# Patient Record
Sex: FEMALE | Race: WHITE | NOT HISPANIC OR LATINO | Employment: FULL TIME | URBAN - NONMETROPOLITAN AREA
[De-identification: names, ages, dates, MRNs, and addresses within clinical notes are randomized per-mention and may not be internally consistent; named-entity substitution may affect disease eponyms.]

---

## 2017-01-03 ENCOUNTER — OFFICE VISIT (OUTPATIENT)
Dept: SURGERY | Facility: CLINIC | Age: 47
End: 2017-01-03

## 2017-01-03 VITALS — BODY MASS INDEX: 46.55 KG/M2 | DIASTOLIC BLOOD PRESSURE: 77 MMHG | WEIGHT: 262.8 LBS | SYSTOLIC BLOOD PRESSURE: 125 MMHG

## 2017-01-03 DIAGNOSIS — R10.13 EPIGASTRIC PAIN: Primary | ICD-10-CM

## 2017-01-03 DIAGNOSIS — K21.9 GASTROESOPHAGEAL REFLUX DISEASE, ESOPHAGITIS PRESENCE NOT SPECIFIED: ICD-10-CM

## 2017-01-03 PROCEDURE — 99212 OFFICE O/P EST SF 10 MIN: CPT | Performed by: SURGERY

## 2017-01-03 RX ORDER — AMOXAPINE 50 MG/1
50 TABLET ORAL 2 TIMES DAILY
Status: ON HOLD | COMMUNITY
End: 2018-03-02

## 2017-01-03 NOTE — PROGRESS NOTES
1/3/2017    Patient Information  Nancy Oliver  [address] [address]  1970  [phone] (home)     Chief Complaint   Patient presents with   • Post-op     EGD and BRAVO         HPI  Patient is a 46-year-old white female for follow-up on EGD with pH probe for severe epigastric pain as well as severe heartburn.  EGD suggestive gastroparesis because there was food still in her stomach.  Helicobacter is positive.  She is now been on medication for this approximately 5 days.  She reports she is much better already.  She reports she's had Helicobacter in the past.  PH study was in adequate.  Only 7 hours of data was collected.  Patient reports she didn't think the box working.        Review of Systems:    General: negative  Integumentary: negative  Eyes: negative  ENT: negative  Respiratory: negative  Gastrointestinal: heartburn, diarrhea and abdominal pain  Cardiovascular: negative  Neurological: headaches and dizziness  Psychiatric: negative  Hematologic/Lymphatic: easy bruising  Genitourinary: negative  Musculoskeletal: painful joints  Endocrine: underactive thyroid  Breasts: negative  There is no problem list on file for this patient.        Past Medical History   Diagnosis Date   • Bronchitis      seasonal   • Carpal tunnel syndrome    • Disease of thyroid gland    • Fibromyalgia    • History of gallstones    • History of transfusion    • Hypertension    • Kidney stones    • Lupus    • Migraine    • Reflux esophagitis          Past Surgical History   Procedure Laterality Date   •  section       x3   • Cholecystectomy     • [place] tooth extraction     • Carpal tunnel release     • Kidney stone surgery       lithotripsy   • Bravo procedure N/A 2016     Procedure: ESOPHAGOGASTRODUODENOSCOPY AND LINDSEY;  Surgeon: Jorge Carmona MD;  Location: [facility];  Service:          Family History   Problem Relation Age of Onset   • Hypertension Mother    • Heart disease Mother    •  Hypertension Father    • Heart disease Father    • Cancer Daughter    • Diabetes Maternal Aunt    • Diabetes Maternal Grandmother          Social History   Substance Use Topics   • Smoking status: Never Smoker   • Smokeless tobacco: None   • Alcohol use No       Current Outpatient Prescriptions   Medication Sig Dispense Refill   • amoxapine (ASENDIN) 50 MG tablet Take 50 mg by mouth 2 (Two) Times a Day.     • B Complex Vitamins (VITAMIN B COMPLEX PO) Take  by mouth.     • DULoxetine (CYMBALTA) 60 MG capsule Take 60 mg by mouth Daily.     • fluticasone (FLONASE) 50 MCG/ACT nasal spray 2 sprays into each nostril Daily.     • FLUTICASONE PROPIONATE, INHAL, IN Inhale 50 mg.     • hydroxychloroquine (PLAQUENIL) 200 MG tablet Take 200 mg by mouth Daily.     • levothyroxine (SYNTHROID, LEVOTHROID) 50 MCG tablet Take 50 mcg by mouth Daily.     • lisinopril (PRINIVIL,ZESTRIL) 20 MG tablet Take 20 mg by mouth Daily.     • ondansetron (ZOFRAN) 4 MG tablet Take 4 mg by mouth Every 8 (Eight) Hours As Needed for nausea or vomiting.     • Vitamin D, Cholecalciferol, (CHOLECALCIFEROL) 400 UNITS tablet Take 400 Units by mouth Daily.     • aspirin-acetaminophen-caffeine (EXCEDRIN MIGRAINE) 250-250-65 MG per tablet Take 1 tablet by mouth Every 6 (Six) Hours As Needed for headaches.     • omeprazole (priLOSEC) 40 MG capsule Take 40 mg by mouth Daily.       No current facility-administered medications for this visit.          Allergies  Contrave [naltrexone-bupropion hcl er]; Keflex [cephalexin]; Penicillins; Sulfa antibiotics; Topamax [topiramate]; and Venlafaxine hcl      Physical Exam      Visit Vitals   • /77   • Wt 262 lb 12.8 oz (119 kg)   • LMP 12/14/2016   • BMI 46.55 kg/m2         ASSESSMENT  Helicobacter gastritis  Possible gastroparesis        PLAN    Continue medication for Helicobacter.  We'll check her back in 1 month.  Depending on how she is doing may need to get a gastric emptying study.  Her pH study was in  adequate, may need to repeat this as well, depending on her symptoms.  Told her to keep taking her PPIs after her antibiotics are finished.        Jorge Carmona MD

## 2017-01-03 NOTE — MR AVS SNAPSHOT
Nancy Oliver   1/3/2017 9:45 AM   Office Visit    Dept Phone:  [phone]   Encounter #:  [id number]    Provider:  Jorge Carmona MD   Department:  [facility] GENERAL SURGERY                Your Full Care Plan              Your Updated Medication List          This list is accurate as of: 1/3/17 11:14 AM.  Always use your most recent med list.                amoxapine 50 MG tablet   Commonly known as:  ASENDIN       aspirin-acetaminophen-caffeine 250-250-65 MG per tablet   Commonly known as:  EXCEDRIN MIGRAINE       DULoxetine 60 MG capsule   Commonly known as:  CYMBALTA       fluticasone 50 MCG/ACT nasal spray   Commonly known as:  FLONASE       FLUTICASONE PROPIONATE (INHAL) IN       hydroxychloroquine 200 MG tablet   Commonly known as:  PLAQUENIL       levothyroxine 50 MCG tablet   Commonly known as:  SYNTHROID, LEVOTHROID       lisinopril 20 MG tablet   Commonly known as:  PRINIVIL,ZESTRIL       omeprazole 40 MG capsule   Commonly known as:  priLOSEC       ondansetron 4 MG tablet   Commonly known as:  ZOFRAN       VITAMIN B COMPLEX PO       Vitamin D (Cholecalciferol) 400 UNITS tablet   Commonly known as:  CHOLECALCIFEROL               Instructions     None    Patient Instructions History      Upcoming Appointments     Visit Type Date Time Department    POST-OP 1/3/2017  9:45 AM [facility] [place]    FOLLOW UP 2017  9:45 AM [facility] Signup     [facility] [employer] allows you to send messages to your doctor, view your test results, renew your prescriptions, schedule appointments, and more. To sign up, go to [employer] and click on the Sign Up Now link in the New User? box. Enter your [employer] Activation Code exactly as it appears below along with the last four digits of your Social Security Number and your Date of Birth () to complete the sign-up process. If you do not sign up before the expiration date, you must  request a new code.    [employer] Activation Code: [email or web address]  Expires: 1/17/2017 11:14 AM    If you have questions, you can email [email or web address] or call [phone] to talk to our [employer] staff. Remember, [employer] is NOT to be used for urgent needs. For medical emergencies, dial 911.               Other Info from Your Visit           Your Appointments     Feb 07, 2017  9:45 AM EST   Follow Up with Jorge Carmona MD   [facility] GENERAL SURGERY (--)    [address] [place] [address] [phone]           Arrive 15 minutes prior to appointment.              Allergies     Contrave [Naltrexone-bupropion Hcl Er]      Keflex [Cephalexin]      Penicillins      Sulfa Antibiotics      Topamax [Topiramate]      Venlafaxine Hcl        Reason for Visit     Post-op EGD and BRAVO      Vital Signs     Blood Pressure Weight Last Menstrual Period Body Mass Index Smoking Status       125/77 262 lb 12.8 oz (119 kg) 12/14/2016 46.55 kg/m2 Never Smoker

## 2017-01-03 NOTE — LETTER
January 3, 2017     HAZEL Hurtado  [address]  [place] [address]    Patient: Nancy Oliver   YOB: 1970   Date of Visit: 1/3/2017       Dear HAZEL Arteaga:    Thank you for referring Nancy Oliver to me for evaluation. Below are the relevant portions of my assessment and plan of care.       HPI  Patient is a 46-year-old white female for follow-up on EGD with pH probe for severe epigastric pain as well as severe heartburn.  EGD suggestive gastroparesis because there was food still in her stomach.  Helicobacter is positive.  She is now been on medication for this approximately 5 days.  She reports she is much better already.  She reports she's had Helicobacter in the past.  PH study was in adequate.  Only 7 hours of data was collected.  Patient reports she didn't think the box working.                ASSESSMENT  Helicobacter gastritis  Possible gastroparesis        PLAN    Continue medication for Helicobacter.  We'll check her back in 1 month.  Depending on how she is doing may need to get a gastric emptying study.  Her pH study was in adequate, may need to repeat this as well, depending on her symptoms.  Told her to keep taking her PPIs after her antibiotics are finished.        If you have questions, please do not hesitate to call me. I look forward to following Nancy along with you.         Sincerely,        Jorge Carmona MD        CC: No Recipients

## 2017-01-10 ENCOUNTER — TELEPHONE (OUTPATIENT)
Dept: SURGERY | Facility: CLINIC | Age: 47
End: 2017-01-10

## 2017-01-13 RX ORDER — LANSOPRAZOLE 15 MG/1
15 CAPSULE, DELAYED RELEASE ORAL DAILY
Qty: 30 CAPSULE | Refills: 1 | Status: SHIPPED | OUTPATIENT
Start: 2017-01-13 | End: 2018-01-13

## 2017-01-25 RX ORDER — FLUCONAZOLE 200 MG/1
200 TABLET ORAL DAILY
Qty: 5 TABLET | Refills: 0 | Status: SHIPPED | OUTPATIENT
Start: 2017-01-25 | End: 2017-01-30

## 2017-02-06 ENCOUNTER — TELEPHONE (OUTPATIENT)
Dept: SURGERY | Facility: CLINIC | Age: 47
End: 2017-02-06

## 2017-02-07 ENCOUNTER — OFFICE VISIT (OUTPATIENT)
Dept: SURGERY | Facility: CLINIC | Age: 47
End: 2017-02-07

## 2017-02-07 VITALS
SYSTOLIC BLOOD PRESSURE: 122 MMHG | DIASTOLIC BLOOD PRESSURE: 81 MMHG | RESPIRATION RATE: 18 BRPM | WEIGHT: 262 LBS | BODY MASS INDEX: 46.42 KG/M2 | HEART RATE: 80 BPM | TEMPERATURE: 98.2 F | HEIGHT: 63 IN

## 2017-02-07 DIAGNOSIS — K21.9 GASTROESOPHAGEAL REFLUX DISEASE, ESOPHAGITIS PRESENCE NOT SPECIFIED: Primary | ICD-10-CM

## 2017-02-07 PROCEDURE — 99212 OFFICE O/P EST SF 10 MIN: CPT | Performed by: SURGERY

## 2017-02-07 NOTE — PROGRESS NOTES
2017    Patient Information  Nancy Oliver  [address] [address]  1970  [phone] (home)     Chief Complaint   Patient presents with   • Follow-up     1 Month Follow up         HPI  Patient is a 46-year-old white female here for follow-up on taking her medication for Helicobacter.  She is having bad symptoms of heartburn.  I scoped her approximately 2 months ago she still had food in her stomach.  Her pH study was in adequate.  However she was found to have Helicobacter.  I also had her continue on Prevacid.  She returns now and says she is doing much better.        Review of Systems:    General: negative  Integumentary: negative  Eyes: negative  ENT: negative  Respiratory: negative  Gastrointestinal: heartburn, diarrhea and abdominal pain  Cardiovascular: negative  Neurological: headaches and dizziness  Psychiatric: negative  Hematologic/Lymphatic: easy bruising  Genitourinary: negative  Musculoskeletal: painful joints  Endocrine: underactive thyroid  Breasts: negative    There is no problem list on file for this patient.        Past Medical History   Diagnosis Date   • Bronchitis      seasonal   • Carpal tunnel syndrome    • Disease of thyroid gland    • Fibromyalgia    • History of gallstones    • History of transfusion    • Hypertension    • Kidney stones    • Lupus    • Migraine    • Reflux esophagitis          Past Surgical History   Procedure Laterality Date   •  section       x3   • Cholecystectomy     • [place] tooth extraction     • Carpal tunnel release     • Kidney stone surgery       lithotripsy   • Bravo procedure N/A 2016     Procedure: ESOPHAGOGASTRODUODENOSCOPY AND LINDSEY;  Surgeon: Jorge Carmona MD;  Location: [facility];  Service:          Family History   Problem Relation Age of Onset   • Hypertension Mother    • Heart disease Mother    • Hypertension Father    • Heart disease Father    • Cancer Daughter    • Diabetes Maternal Aunt    • Diabetes  "Maternal Grandmother          Social History   Substance Use Topics   • Smoking status: Never Smoker   • Smokeless tobacco: None   • Alcohol use No       Current Outpatient Prescriptions   Medication Sig Dispense Refill   • amoxapine (ASENDIN) 50 MG tablet Take 50 mg by mouth 2 (Two) Times a Day.     • aspirin-acetaminophen-caffeine (EXCEDRIN MIGRAINE) 250-250-65 MG per tablet Take 1 tablet by mouth Every 6 (Six) Hours As Needed for headaches.     • B Complex Vitamins (VITAMIN B COMPLEX PO) Take  by mouth.     • DULoxetine (CYMBALTA) 60 MG capsule Take 60 mg by mouth Daily.     • fluticasone (FLONASE) 50 MCG/ACT nasal spray 2 sprays into each nostril Daily.     • FLUTICASONE PROPIONATE, INHAL, IN Inhale 50 mg.     • hydroxychloroquine (PLAQUENIL) 200 MG tablet Take 200 mg by mouth Daily.     • lansoprazole (PREVACID) 15 MG capsule Take 1 capsule by mouth Daily. 30 capsule 1   • levothyroxine (SYNTHROID, LEVOTHROID) 50 MCG tablet Take 50 mcg by mouth Daily.     • lisinopril (PRINIVIL,ZESTRIL) 20 MG tablet Take 20 mg by mouth Daily.     • omeprazole (priLOSEC) 40 MG capsule Take 40 mg by mouth Daily.     • ondansetron (ZOFRAN) 4 MG tablet Take 4 mg by mouth Every 8 (Eight) Hours As Needed for nausea or vomiting.     • Vitamin D, Cholecalciferol, (CHOLECALCIFEROL) 400 UNITS tablet Take 400 Units by mouth Daily.       No current facility-administered medications for this visit.          Allergies  Contrave [naltrexone-bupropion hcl er]; Keflex [cephalexin]; Penicillins; Sulfa antibiotics; Topamax [topiramate]; and Venlafaxine hcl      Physical Exam      Visit Vitals   • /81   • Pulse 80   • Temp 98.2 °F (36.8 °C) (Oral)   • Resp 18   • Ht 63\" (160 cm)   • Wt 262 lb (119 kg)   • BMI 46.41 kg/m2         ASSESSMENT  Gastroesophageal reflux disease adequately controlled with Prevacid        PLAN    Return when necessary        Jorge Carmona MD    "

## 2017-04-13 ENCOUNTER — HOSPITAL ENCOUNTER (OUTPATIENT)
Dept: HOSPITAL 79 - KOH-I | Age: 47
End: 2017-04-13
Payer: COMMERCIAL

## 2017-04-13 DIAGNOSIS — R05: Primary | ICD-10-CM

## 2018-02-22 ENCOUNTER — OFFICE VISIT (OUTPATIENT)
Dept: SURGERY | Facility: CLINIC | Age: 48
End: 2018-02-22

## 2018-02-22 VITALS
DIASTOLIC BLOOD PRESSURE: 93 MMHG | WEIGHT: 252 LBS | HEIGHT: 63 IN | BODY MASS INDEX: 44.65 KG/M2 | SYSTOLIC BLOOD PRESSURE: 140 MMHG | HEART RATE: 89 BPM | TEMPERATURE: 98.3 F | RESPIRATION RATE: 17 BRPM

## 2018-02-22 DIAGNOSIS — R10.13 EPIGASTRIC PAIN: ICD-10-CM

## 2018-02-22 DIAGNOSIS — K62.5 RECTAL BLEED: ICD-10-CM

## 2018-02-22 DIAGNOSIS — K21.9 GASTROESOPHAGEAL REFLUX DISEASE, ESOPHAGITIS PRESENCE NOT SPECIFIED: Primary | ICD-10-CM

## 2018-02-22 PROCEDURE — 99214 OFFICE O/P EST MOD 30 MIN: CPT | Performed by: SURGERY

## 2018-02-22 RX ORDER — LIDOCAINE 50 MG/G
OINTMENT TOPICAL
Refills: 11 | COMMUNITY
Start: 2018-02-13 | End: 2022-05-24

## 2018-02-22 RX ORDER — CLINDAMYCIN PHOSPHATE 11.9 MG/ML
SOLUTION TOPICAL
Refills: 11 | COMMUNITY
Start: 2018-02-13 | End: 2021-07-16

## 2018-02-22 RX ORDER — FOLIC ACID 1 MG/1
TABLET ORAL
Refills: 2 | COMMUNITY
Start: 2018-02-12 | End: 2021-07-16

## 2018-02-22 NOTE — PROGRESS NOTES
2018    Patient Information  Nancy Oliver  [address] [place] [address]  1970  [phone] (home)     Chief Complaint   Patient presents with   • Colonoscopy     Referred for Colonoscopy       HPI  Patient is a 47-year-old white female who is having blood in stool and constipation.  She is also having stool which has a ground appearance.  She has an uncle who had colon cancer.  This is been going on for a few weeks and is getting worse.  Patient also has lots reflux symptomatology.  I performed a pH study on her a few months ago however it was an inadequate study.    Review of Systems:  The Past medical history, family history, social history, medication list, allergies, and Review of Systems has been reviewed and confirmed.    General: negative  Integumentary: negative  Eyes: negative  ENT: negative  Respiratory: negative  Gastrointestinal: heartburn, diarrhea and abdominal pain, blood in stool,constipation  Cardiovascular: negative  Neurological: headaches and dizziness  Psychiatric: negative  Hematologic/Lymphatic: easy bruising  Genitourinary: negative  Musculoskeletal: painful joints  Endocrine: underactive thyroid  Breasts: negative      There is no problem list on file for this patient.        Past Medical History:   Diagnosis Date   • Bronchitis     seasonal   • Carpal tunnel syndrome    • Disease of thyroid gland    • Fibromyalgia    • History of gallstones    • History of transfusion    • Hypertension    • Kidney stones    • Lupus    • Migraine    • Reflux esophagitis          Past Surgical History:   Procedure Laterality Date   • BRAVO PROCEDURE N/A 2016    Procedure: ESOPHAGOGASTRODUODENOSCOPY AND LINDSEY;  Surgeon: Jorge Carmona MD;  Location: [facility];  Service:    • CARPAL TUNNEL RELEASE     •  SECTION      x3   • CHOLECYSTECTOMY     • KIDNEY STONE SURGERY      lithotripsy   • WISDOM TOOTH EXTRACTION           Family History   Problem Relation Age of Onset   •  Hypertension Mother    • Heart disease Mother    • Hypertension Father    • Heart disease Father    • Cancer Daughter    • Diabetes Maternal Aunt    • Diabetes Maternal Grandmother          Social History   Substance Use Topics   • Smoking status: Never Smoker   • Smokeless tobacco: Not on file   • Alcohol use No       Current Outpatient Prescriptions   Medication Sig Dispense Refill   • amoxapine (ASENDIN) 50 MG tablet Take 50 mg by mouth 2 (Two) Times a Day.     • aspirin-acetaminophen-caffeine (EXCEDRIN MIGRAINE) 250-250-65 MG per tablet Take 1 tablet by mouth Every 6 (Six) Hours As Needed for headaches.     • B Complex Vitamins (VITAMIN B COMPLEX PO) Take  by mouth.     • clindamycin (CLEOCIN T) 1 % external solution Twice daily, mix the contents of 1 bottle (30ml) in foot bath and soak feet for 10 minutes Follow attached instructions  11   • DULoxetine (CYMBALTA) 60 MG capsule Take 60 mg by mouth Daily.     • econazole nitrate (SPECTAZOLE) 1 % cream Twice daily, mix 30 grams (contents of 1 tube) in foot bath and soak feet for 10 minutes.  Follow attached instructions  11   • fluticasone (FLONASE) 50 MCG/ACT nasal spray 2 sprays into each nostril Daily.     • FLUTICASONE PROPIONATE, INHAL, IN Inhale 50 mg.     • folic acid (FOLVITE) 1 MG tablet   2   • hydroxychloroquine (PLAQUENIL) 200 MG tablet Take 200 mg by mouth Daily.     • levothyroxine (SYNTHROID, LEVOTHROID) 50 MCG tablet Take 50 mcg by mouth Daily.     • lidocaine (XYLOCAINE) 5 % ointment Apply a pea size amount (1gram) to affected area up to twice daily as needed for pain  11   • lisinopril (PRINIVIL,ZESTRIL) 20 MG tablet Take 20 mg by mouth Daily.     • methotrexate 2.5 MG tablet   2   • omeprazole (priLOSEC) 40 MG capsule Take 40 mg by mouth Daily.     • ondansetron (ZOFRAN) 4 MG tablet Take 4 mg by mouth Every 8 (Eight) Hours As Needed for nausea or vomiting.     • Probiotic Product (PROBIOTIC + OMEGA-3 PO) Take  by mouth.     • Vitamin D,  "Cholecalciferol, (CHOLECALCIFEROL) 400 UNITS tablet Take 400 Units by mouth Daily.       No current facility-administered medications for this visit.          Allergies  Contrave [naltrexone-bupropion hcl er]; Keflex [cephalexin]; Penicillins; Sulfa antibiotics; Topamax [topiramate]; and Venlafaxine hcl    /93  Pulse 89  Temp 98.3 °F (36.8 °C)  Resp 17  Ht 160 cm (63\")  Wt 114 kg (252 lb)  BMI 44.64 kg/m2     Physical Exam   Constitutional: She is oriented to person, place, and time. She appears well-developed and well-nourished. No distress.   HENT:   Head: Normocephalic.   Right Ear: External ear normal.   Left Ear: External ear normal.   Nose: Nose normal.   Mouth/Throat: Oropharynx is clear and moist.   Eyes: Conjunctivae and EOM are normal. Right eye exhibits no discharge. Left eye exhibits no discharge.   Neck: Normal range of motion. No JVD present. No tracheal deviation present. No thyromegaly present.   Cardiovascular: Normal rate, regular rhythm, normal heart sounds and intact distal pulses.  Exam reveals no gallop and no friction rub.    No murmur heard.  Pulmonary/Chest: Effort normal and breath sounds normal. No stridor. No respiratory distress. She has no wheezes. She has no rales. She exhibits no tenderness.   Abdominal: Soft. Bowel sounds are normal. She exhibits no distension and no mass. There is no tenderness. There is no rebound and no guarding. No hernia.   Genitourinary: Rectal exam shows guaiac negative stool.   Musculoskeletal: Normal range of motion. She exhibits no edema, tenderness or deformity.   Lymphadenopathy:     She has no cervical adenopathy.   Neurological: She is alert and oriented to person, place, and time. She has normal reflexes. She displays normal reflexes. No cranial nerve deficit. She exhibits normal muscle tone. Coordination normal.   Skin: Skin is warm and dry. No rash noted. She is not diaphoretic. No erythema. No pallor.   Psychiatric: She has a normal mood " and affect. Her behavior is normal. Judgment and thought content normal.   Nursing note and vitals reviewed.                ASSESSMENT  Reflux disease and blood per rectum        PLAN    We'll repeat pH study and perform colonoscopy          This document signed by Jorge Carmona MD February 22, 2018 10:15 AM

## 2018-02-23 PROBLEM — R10.13 EPIGASTRIC PAIN: Status: ACTIVE | Noted: 2018-02-23

## 2018-02-23 PROBLEM — K62.5 RECTAL BLEED: Status: ACTIVE | Noted: 2018-02-23

## 2018-02-23 PROBLEM — K21.9 GASTROESOPHAGEAL REFLUX DISEASE: Status: ACTIVE | Noted: 2018-02-23

## 2018-02-26 RX ORDER — KETOROLAC TROMETHAMINE 10 MG/1
10 TABLET, FILM COATED ORAL EVERY 6 HOURS PRN
COMMUNITY
End: 2022-05-24

## 2018-02-26 RX ORDER — TRAMADOL HYDROCHLORIDE 50 MG/1
50 TABLET ORAL EVERY 6 HOURS PRN
COMMUNITY
End: 2022-11-10 | Stop reason: HOSPADM

## 2018-03-02 ENCOUNTER — HOSPITAL ENCOUNTER (OUTPATIENT)
Facility: HOSPITAL | Age: 48
Setting detail: HOSPITAL OUTPATIENT SURGERY
Discharge: HOME | End: 2018-03-02
Attending: SURGERY | Admitting: SURGERY

## 2018-03-02 ENCOUNTER — ANESTHESIA EVENT (OUTPATIENT)
Dept: PERIOP | Facility: HOSPITAL | Age: 48
End: 2018-03-02

## 2018-03-02 ENCOUNTER — ANESTHESIA (OUTPATIENT)
Dept: PERIOP | Facility: HOSPITAL | Age: 48
End: 2018-03-02

## 2018-03-02 VITALS
BODY MASS INDEX: 44.65 KG/M2 | DIASTOLIC BLOOD PRESSURE: 68 MMHG | SYSTOLIC BLOOD PRESSURE: 111 MMHG | HEIGHT: 63 IN | WEIGHT: 252 LBS | OXYGEN SATURATION: 97 % | RESPIRATION RATE: 20 BRPM | TEMPERATURE: 98 F | HEART RATE: 68 BPM

## 2018-03-02 LAB
B-HCG UR QL: NEGATIVE
INTERNAL NEGATIVE CONTROL: NEGATIVE
INTERNAL POSITIVE CONTROL: POSITIVE
Lab: NORMAL

## 2018-03-02 PROCEDURE — 25010000002 PROPOFOL 10 MG/ML EMULSION: Performed by: NURSE ANESTHETIST, CERTIFIED REGISTERED

## 2018-03-02 PROCEDURE — 25010000002 ONDANSETRON PER 1 MG: Performed by: NURSE ANESTHETIST, CERTIFIED REGISTERED

## 2018-03-02 PROCEDURE — 25010000002 FENTANYL CITRATE (PF) 100 MCG/2ML SOLUTION: Performed by: NURSE ANESTHETIST, CERTIFIED REGISTERED

## 2018-03-02 PROCEDURE — 91035 G-ESOPH REFLX TST W/ELECTROD: CPT | Performed by: SURGERY

## 2018-03-02 PROCEDURE — 43235 EGD DIAGNOSTIC BRUSH WASH: CPT | Performed by: SURGERY

## 2018-03-02 PROCEDURE — 45378 DIAGNOSTIC COLONOSCOPY: CPT | Performed by: SURGERY

## 2018-03-02 RX ORDER — ONDANSETRON 2 MG/ML
4 INJECTION INTRAMUSCULAR; INTRAVENOUS ONCE AS NEEDED
Status: DISCONTINUED | OUTPATIENT
Start: 2018-03-02 | End: 2018-03-02 | Stop reason: HOSPADM

## 2018-03-02 RX ORDER — ONDANSETRON 2 MG/ML
INJECTION INTRAMUSCULAR; INTRAVENOUS AS NEEDED
Status: DISCONTINUED | OUTPATIENT
Start: 2018-03-02 | End: 2018-03-02 | Stop reason: SURG

## 2018-03-02 RX ORDER — FENTANYL CITRATE 50 UG/ML
50 INJECTION, SOLUTION INTRAMUSCULAR; INTRAVENOUS
Status: DISCONTINUED | OUTPATIENT
Start: 2018-03-02 | End: 2018-03-02 | Stop reason: HOSPADM

## 2018-03-02 RX ORDER — OXYCODONE HYDROCHLORIDE AND ACETAMINOPHEN 5; 325 MG/1; MG/1
1 TABLET ORAL ONCE AS NEEDED
Status: DISCONTINUED | OUTPATIENT
Start: 2018-03-02 | End: 2018-03-02 | Stop reason: HOSPADM

## 2018-03-02 RX ORDER — MEPERIDINE HYDROCHLORIDE 50 MG/ML
12.5 INJECTION INTRAMUSCULAR; INTRAVENOUS; SUBCUTANEOUS
Status: DISCONTINUED | OUTPATIENT
Start: 2018-03-02 | End: 2018-03-02 | Stop reason: HOSPADM

## 2018-03-02 RX ORDER — FENTANYL CITRATE 50 UG/ML
INJECTION, SOLUTION INTRAMUSCULAR; INTRAVENOUS AS NEEDED
Status: DISCONTINUED | OUTPATIENT
Start: 2018-03-02 | End: 2018-03-02 | Stop reason: SURG

## 2018-03-02 RX ORDER — PROPOFOL 10 MG/ML
VIAL (ML) INTRAVENOUS CONTINUOUS PRN
Status: DISCONTINUED | OUTPATIENT
Start: 2018-03-02 | End: 2018-03-02 | Stop reason: SURG

## 2018-03-02 RX ORDER — SODIUM CHLORIDE, SODIUM LACTATE, POTASSIUM CHLORIDE, CALCIUM CHLORIDE 600; 310; 30; 20 MG/100ML; MG/100ML; MG/100ML; MG/100ML
125 INJECTION, SOLUTION INTRAVENOUS CONTINUOUS
Status: DISCONTINUED | OUTPATIENT
Start: 2018-03-02 | End: 2018-03-02 | Stop reason: HOSPADM

## 2018-03-02 RX ORDER — IPRATROPIUM BROMIDE AND ALBUTEROL SULFATE 2.5; .5 MG/3ML; MG/3ML
3 SOLUTION RESPIRATORY (INHALATION) ONCE AS NEEDED
Status: DISCONTINUED | OUTPATIENT
Start: 2018-03-02 | End: 2018-03-02 | Stop reason: HOSPADM

## 2018-03-02 RX ORDER — SODIUM CHLORIDE 0.9 % (FLUSH) 0.9 %
1-10 SYRINGE (ML) INJECTION AS NEEDED
Status: DISCONTINUED | OUTPATIENT
Start: 2018-03-02 | End: 2018-03-02 | Stop reason: HOSPADM

## 2018-03-02 RX ADMIN — ONDANSETRON 4 MG: 2 INJECTION, SOLUTION INTRAMUSCULAR; INTRAVENOUS at 07:38

## 2018-03-02 RX ADMIN — PROPOFOL 100 MCG/KG/MIN: 10 INJECTION, EMULSION INTRAVENOUS at 07:38

## 2018-03-02 RX ADMIN — SODIUM CHLORIDE, POTASSIUM CHLORIDE, SODIUM LACTATE AND CALCIUM CHLORIDE: 600; 310; 30; 20 INJECTION, SOLUTION INTRAVENOUS at 07:30

## 2018-03-02 RX ADMIN — FENTANYL CITRATE 100 MCG: 50 INJECTION INTRAMUSCULAR; INTRAVENOUS at 07:30

## 2018-03-02 NOTE — H&P (VIEW-ONLY)
2018    Patient Information  Nancy Oliver  [address] [facility] [place] [address]  1970  [phone] (home)     Chief Complaint   Patient presents with   • Colonoscopy     Referred for Colonoscopy       HPI  Patient is a 47-year-old white female who is having blood in stool and constipation.  She is also having stool which has a ground appearance.  She has an uncle who had colon cancer.  This is been going on for a few weeks and is getting worse.  Patient also has lots reflux symptomatology.  I performed a pH study on her a few months ago however it was an inadequate study.    Review of Systems:  The Past medical history, family history, social history, medication list, allergies, and Review of Systems has been reviewed and confirmed.    General: negative  Integumentary: negative  Eyes: negative  ENT: negative  Respiratory: negative  Gastrointestinal: heartburn, diarrhea and abdominal pain, blood in stool,constipation  Cardiovascular: negative  Neurological: headaches and dizziness  Psychiatric: negative  Hematologic/Lymphatic: easy bruising  Genitourinary: negative  Musculoskeletal: painful joints  Endocrine: underactive thyroid  Breasts: negative      There is no problem list on file for this patient.        Past Medical History:   Diagnosis Date   • Bronchitis     seasonal   • Carpal tunnel syndrome    • Disease of thyroid gland    • Fibromyalgia    • History of gallstones    • History of transfusion    • Hypertension    • Kidney stones    • Lupus    • Migraine    • Reflux esophagitis          Past Surgical History:   Procedure Laterality Date   • BRAVO PROCEDURE N/A 2016    Procedure: ESOPHAGOGASTRODUODENOSCOPY AND LINDSEY;  Surgeon: Jorge Carmona MD;  Location: [facility];  Service:    • CARPAL TUNNEL RELEASE     •  SECTION      x3   • CHOLECYSTECTOMY     • KIDNEY STONE SURGERY      lithotripsy   • WISDOM TOOTH EXTRACTION           Family History   Problem Relation Age of Onset   •  Hypertension Mother    • Heart disease Mother    • Hypertension Father    • Heart disease Father    • Cancer Daughter    • Diabetes Maternal Aunt    • Diabetes Maternal Grandmother          Social History   Substance Use Topics   • Smoking status: Never Smoker   • Smokeless tobacco: Not on file   • Alcohol use No       Current Outpatient Prescriptions   Medication Sig Dispense Refill   • amoxapine (ASENDIN) 50 MG tablet Take 50 mg by mouth 2 (Two) Times a Day.     • aspirin-acetaminophen-caffeine (EXCEDRIN MIGRAINE) 250-250-65 MG per tablet Take 1 tablet by mouth Every 6 (Six) Hours As Needed for headaches.     • B Complex Vitamins (VITAMIN B COMPLEX PO) Take  by mouth.     • clindamycin (CLEOCIN T) 1 % external solution Twice daily, mix the contents of 1 bottle (30ml) in foot bath and soak feet for 10 minutes Follow attached instructions  11   • DULoxetine (CYMBALTA) 60 MG capsule Take 60 mg by mouth Daily.     • econazole nitrate (SPECTAZOLE) 1 % cream Twice daily, mix 30 grams (contents of 1 tube) in foot bath and soak feet for 10 minutes.  Follow attached instructions  11   • fluticasone (FLONASE) 50 MCG/ACT nasal spray 2 sprays into each nostril Daily.     • FLUTICASONE PROPIONATE, INHAL, IN Inhale 50 mg.     • folic acid (FOLVITE) 1 MG tablet   2   • hydroxychloroquine (PLAQUENIL) 200 MG tablet Take 200 mg by mouth Daily.     • levothyroxine (SYNTHROID, LEVOTHROID) 50 MCG tablet Take 50 mcg by mouth Daily.     • lidocaine (XYLOCAINE) 5 % ointment Apply a pea size amount (1gram) to affected area up to twice daily as needed for pain  11   • lisinopril (PRINIVIL,ZESTRIL) 20 MG tablet Take 20 mg by mouth Daily.     • methotrexate 2.5 MG tablet   2   • omeprazole (priLOSEC) 40 MG capsule Take 40 mg by mouth Daily.     • ondansetron (ZOFRAN) 4 MG tablet Take 4 mg by mouth Every 8 (Eight) Hours As Needed for nausea or vomiting.     • Probiotic Product (PROBIOTIC + OMEGA-3 PO) Take  by mouth.     • Vitamin D,  "Cholecalciferol, (CHOLECALCIFEROL) 400 UNITS tablet Take 400 Units by mouth Daily.       No current facility-administered medications for this visit.          Allergies  Contrave [naltrexone-bupropion hcl er]; Keflex [cephalexin]; Penicillins; Sulfa antibiotics; Topamax [topiramate]; and Venlafaxine hcl    /93  Pulse 89  Temp 98.3 °F (36.8 °C)  Resp 17  Ht 160 cm (63\")  Wt 114 kg (252 lb)  BMI 44.64 kg/m2     Physical Exam   Constitutional: She is oriented to person, place, and time. She appears well-developed and well-nourished. No distress.   HENT:   Head: Normocephalic.   Right Ear: External ear normal.   Left Ear: External ear normal.   Nose: Nose normal.   Mouth/Throat: Oropharynx is clear and moist.   Eyes: Conjunctivae and EOM are normal. Right eye exhibits no discharge. Left eye exhibits no discharge.   Neck: Normal range of motion. No JVD present. No tracheal deviation present. No thyromegaly present.   Cardiovascular: Normal rate, regular rhythm, normal heart sounds and intact distal pulses.  Exam reveals no gallop and no friction rub.    No murmur heard.  Pulmonary/Chest: Effort normal and breath sounds normal. No stridor. No respiratory distress. She has no wheezes. She has no rales. She exhibits no tenderness.   Abdominal: Soft. Bowel sounds are normal. She exhibits no distension and no mass. There is no tenderness. There is no rebound and no guarding. No hernia.   Genitourinary: Rectal exam shows guaiac negative stool.   Musculoskeletal: Normal range of motion. She exhibits no edema, tenderness or deformity.   Lymphadenopathy:     She has no cervical adenopathy.   Neurological: She is alert and oriented to person, place, and time. She has normal reflexes. She displays normal reflexes. No cranial nerve deficit. She exhibits normal muscle tone. Coordination normal.   Skin: Skin is warm and dry. No rash noted. She is not diaphoretic. No erythema. No pallor.   Psychiatric: She has a normal mood " and affect. Her behavior is normal. Judgment and thought content normal.   Nursing note and vitals reviewed.                ASSESSMENT  Reflux disease and blood per rectum        PLAN    We'll repeat pH study and perform colonoscopy          This document signed by Jorge Carmona MD February 22, 2018 10:15 AM

## 2018-03-02 NOTE — DISCHARGE INSTRUCTIONS
General Anesthesia, Adult, Care After  Refer to this sheet in the next few weeks. These instructions provide you with information on caring for yourself after your procedure. Your health care provider may also give you more specific instructions. Your treatment has been planned according to current medical practices, but problems sometimes occur. Call your health care provider if you have any problems or questions after your procedure.  WHAT TO EXPECT AFTER THE PROCEDURE  After the procedure, it is typical to experience:  · Sleepiness.  · Nausea and vomiting.  HOME CARE INSTRUCTIONS  · For the first 24 hours after general anesthesia:  ¨ Have a responsible person with you.  ¨ Do not drive a car. If you are alone, do not take public transportation.  ¨ Do not drink alcohol.  ¨ Do not take medicine that has not been prescribed by your health care provider.  ¨ Do not sign important papers or make important decisions.  ¨ You may resume a normal diet and activities as directed by your health care provider.  · Change bandages (dressings) as directed.  · If you have questions or problems that seem related to general anesthesia, call the hospital and ask for the anesthetist or anesthesiologist on call.  SEEK MEDICAL CARE IF:  · You have nausea and vomiting that continue the day after anesthesia.  · You develop a rash.  SEEK IMMEDIATE MEDICAL CARE IF:    · You have difficulty breathing.  · You have chest pain.  · You have any allergic problems.  This information is not intended to replace advice given to you by your health care provider. Make sure you discuss any questions you have with your health care provider.  Document Released: 03/26/2002 Document Revised: 01/08/2016 Document Reviewed: 07/03/2014  [employer] Interactive Patient Education ©2016 [employer]  Esophagogastroduodenoscopy, Care After  Refer to this sheet in the next few weeks. These instructions provide you with information about caring for yourself after your  procedure. Your health care provider may also give you more specific instructions. Your treatment has been planned according to current medical practices, but problems sometimes occur. Call your health care provider if you have any problems or questions after your procedure.  WHAT TO EXPECT AFTER THE PROCEDURE  After your procedure, it is typical to feel:  · Soreness in your throat.  · Pain with swallowing.  · Sick to your stomach (nauseous).  · Bloated.  · Dizzy.  · Fatigued.  HOME CARE INSTRUCTIONS  · Do not eat or drink anything until the numbing medicine (local anesthetic) has worn off and your gag reflex has returned. You will know that the local anesthetic has worn off when you can swallow comfortably.  · Do not drive or operate machinery until directed by your health care provider.  · Take medicines only as directed by your health care provider.  SEEK MEDICAL CARE IF:    · You cannot stop coughing.  · You are not urinating at all or less than usual.  SEEK IMMEDIATE MEDICAL CARE IF:  · You have difficulty swallowing.  · You cannot eat or drink.  · You have worsening throat or chest pain.  · You have dizziness or lightheadedness or you faint.  · You have nausea or vomiting.  · You have chills.  · You have a fever.  · You have severe abdominal pain.  · You have black, tarry, or bloody stools.  This information is not intended to replace advice given to you by your health care provider. Make sure you discuss any questions you have with your health care provider.  Document Released: 12/04/2013 Document Revised: 01/08/2016 Document Reviewed: 12/04/2013  [employer] Interactive Patient Education ©2016 [employer]  Colonoscopy, Care After  Refer to this sheet in the next few weeks. These instructions provide you with information on caring for yourself after your procedure. Your health care provider may also give you more specific instructions. Your treatment has been planned according to current medical practices, but  problems sometimes occur. Call your health care provider if you have any problems or questions after your procedure.  WHAT TO EXPECT AFTER THE PROCEDURE    After your procedure, it is typical to have the following:  · A small amount of blood in your stool.  · Moderate amounts of gas and mild abdominal cramping or bloating.  HOME CARE INSTRUCTIONS  · Do not drive, operate machinery, or sign important documents for 24 hours.  · You may shower and resume your regular physical activities, but move at a slower pace for the first 24 hours.  · Take frequent rest periods for the first 24 hours.  · Walk around or put a warm pack on your abdomen to help reduce abdominal cramping and bloating.  · Drink enough fluids to keep your urine clear or pale yellow.  · You may resume your normal diet as instructed by your health care provider. Avoid heavy or fried foods that are hard to digest.  · Avoid drinking alcohol for 24 hours or as instructed by your health care provider.  · Only take over-the-counter or prescription medicines as directed by your health care provider.  · If a tissue sample (biopsy) was taken during your procedure:  ¨ Do not take aspirin or blood thinners for 7 days, or as instructed by your health care provider.  ¨ Do not drink alcohol for 7 days, or as instructed by your health care provider.  ¨ Eat soft foods for the first 24 hours.  SEEK MEDICAL CARE IF:  You have persistent spotting of blood in your stool 2-3 days after the procedure.  SEEK IMMEDIATE MEDICAL CARE IF:  · You have more than a small spotting of blood in your stool.  · You pass large blood clots in your stool.  · Your abdomen is swollen (distended).  · You have nausea or vomiting.  · You have a fever.  · You have increasing abdominal pain that is not relieved with medicine.  This information is not intended to replace advice given to you by your health care provider. Make sure you discuss any questions you have with your health care  provider.  Document Released: 08/01/2005 Document Revised: 10/08/2014 Document Reviewed: 08/25/2014  [employer] Interactive Patient Education ©2016 [employer]  Hiatal Hernia  A hiatal hernia occurs when part of your stomach slides above the muscle that separates your abdomen from your chest (diaphragm). You can be born with a hiatal hernia (congenital), or it may develop over time. In almost all cases of hiatal hernia, only the top part of the stomach pushes through.      Many people have a hiatal hernia with no symptoms. The larger the hernia, the more likely that you will have symptoms. In some cases, a hiatal hernia allows stomach acid to flow back into the tube that carries food from your mouth to your stomach (esophagus). This may cause heartburn symptoms. Severe heartburn symptoms may mean you have developed a condition called gastroesophageal reflux disease (GERD).    CAUSES    Hiatal hernias are caused by a weakness in the opening (hiatus) where your esophagus passes through your diaphragm to attach to the upper part of your stomach. You may be born with a weakness in your hiatus, or a weakness can develop.  RISK FACTORS  Older age is a major risk factor for a hiatal hernia. Anything that increases pressure on your diaphragm can also increase your risk of a hiatal hernia. This includes:  · Pregnancy.  · Excess weight.  · Frequent constipation.  SIGNS AND SYMPTOMS    People with a hiatal hernia often have no symptoms. If symptoms develop, they are almost always caused by GERD. They may include:  · Heartburn.  · Belching.  · Indigestion.  · Trouble swallowing.  · Coughing or wheezing.   · Sore throat.  · Hoarseness.  · Chest pain.  DIAGNOSIS    A hiatal hernia is sometimes found during an exam for another problem. Your health care provider may suspect a hiatal hernia if you have symptoms of GERD. Tests may be done to diagnose GERD. These may include:  · X-rays of your stomach or chest.  · An upper  gastrointestinal (GI) series. This is an X-ray exam of your GI tract involving the use of a chalky liquid that you swallow. The liquid shows up clearly on the X-ray.  · Endoscopy. This is a procedure to look into your stomach using a thin, flexible tube that has a tiny camera and light on the end of it.  TREATMENT    If you have no symptoms, you may not need treatment. If you have symptoms, treatment may include:  · Dietary and lifestyle changes to help reduce GERD symptoms.  · Medicines. These may include:  ¨ Over-the-counter antacids.  ¨ Medicines that make your stomach empty more quickly.  ¨ Medicines that block the production of stomach acid (H2 blockers).  ¨ Stronger medicines to reduce stomach acid (proton pump inhibitors).  · You may need surgery to repair the hernia if other treatments are not helping.  HOME CARE INSTRUCTIONS    · Take all medicines as directed by your health care provider.  · Quit smoking, if you smoke.  · Try to achieve and maintain a healthy body weight.  · Eat frequent small meals instead of three large meals a day. This keeps your stomach from getting too full.  ¨ Eat slowly.  ¨ Do not lie down right after eating.   ¨ Do not eat 1-2 hours before bed.    · Do not drink beverages with caffeine. These include cola, coffee, cocoa, and tea.  · Do not drink alcohol.  · Avoid foods that can make symptoms of GERD worse. These may include:  ¨ Fatty foods.  ¨ Citrus fruits.  ¨ Other foods and drinks that contain acid.  · Avoid putting pressure on your belly. Anything that puts pressure on your belly increases the amount of acid that may be pushed up into your esophagus.    ¨ Avoid bending over, especially after eating.  ¨ Raise the head of your bed by putting blocks under the legs. This keeps your head and esophagus higher than your stomach.  ¨ Do not wear tight clothing around your chest or stomach.  ¨ Try not to strain when having a bowel movement, when urinating, or when lifting heavy  objects.  SEEK MEDICAL CARE IF:  · Your symptoms are not controlled with medicines or lifestyle changes.  · You are having trouble swallowing.  · You have coughing or wheezing that will not go away.  SEEK IMMEDIATE MEDICAL CARE IF:  · Your pain is getting worse.  · Your pain spreads to your arms, neck, jaw, teeth, or back.  · You have shortness of breath.  · You sweat for no reason.  · You feel sick to your stomach (nauseous) or vomit.  · You vomit blood.  · You have bright red blood in your stools.  · You have black, tarry stools.    This information is not intended to replace advice given to you by your health care provider. Make sure you discuss any questions you have with your health care provider.  Document Released: 03/09/2005 Document Revised: 01/08/2016 Document Reviewed: 12/05/2014  [employer] Interactive Patient Education ©2016 [employer]

## 2018-03-02 NOTE — ANESTHESIA PREPROCEDURE EVALUATION
Anesthesia Evaluation                  Airway   Mallampati: II  TM distance: >3 FB  Neck ROM: full  no difficulty expected  Dental - normal exam     Pulmonary - normal exam   Cardiovascular - normal exam    (+) hypertension,       Neuro/Psych  (+) headaches, numbness,     GI/Hepatic/Renal/Endo    (+) morbid obesity, GERD, GI bleeding,     Musculoskeletal     Abdominal  - normal exam    Bowel sounds: normal.   Substance History      OB/GYN          Other                        Anesthesia Plan    ASA 3     general     intravenous induction   Anesthetic plan and risks discussed with patient.    Plan discussed with CRNA.

## 2018-03-02 NOTE — PLAN OF CARE
Problem: Patient Care Overview (Adult)  Goal: Plan of Care Review  Outcome: Ongoing (interventions implemented as appropriate)   03/02/18 0653   Coping/Psychosocial Response Interventions   Plan Of Care Reviewed With patient   Patient Care Overview   Progress progress toward functional goals as expected

## 2018-03-02 NOTE — ANESTHESIA POSTPROCEDURE EVALUATION
Patient: Nancy Oliver    Procedure Summary     Date Anesthesia Start Anesthesia Stop Room / Location    03/02/18 0734 0759  [facility] / [facility]       Procedure Diagnosis Surgeon Provider    ESOPHAGOGASTRODUODENOSCOPY AND BRAVO (N/A Esophagus); COLONOSCOPY (N/A ) Epigastric pain; Rectal bleed; Gastroesophageal reflux disease, esophagitis presence not specified  (Epigastric pain [R10.13]; Rectal bleed [K62.5]; Gastroesophageal reflux disease, esophagitis presence not specified [K21.9]) MD Rudy Crawford,           Anesthesia Type: general  Last vitals  BP   111/68 (03/02/18 0832)   Temp   98 °F (36.7 °C) (03/02/18 0802)   Pulse   68 (03/02/18 0832)   Resp   20 (03/02/18 0832)     SpO2   97 % (03/02/18 0832)     Post Anesthesia Care and Evaluation    Patient location during evaluation: PHASE II  Patient participation: complete - patient participated  Level of consciousness: awake and alert  Pain score: 1  Pain management: adequate  Airway patency: patent  Anesthetic complications: No anesthetic complications  PONV Status: controlled  Cardiovascular status: acceptable  Respiratory status: acceptable  Hydration status: acceptable

## 2018-03-02 NOTE — PLAN OF CARE
Problem: GI Endoscopy (Adult)  Goal: Signs and Symptoms of Listed Potential Problems Will be Absent or Manageable (GI Endoscopy)  Outcome: Ongoing (interventions implemented as appropriate)   03/02/18 0653   GI Endoscopy   Problems Assessed (GI Endoscopy) all   Problems Present (GI Endoscopy) none

## 2021-07-15 PROBLEM — K62.5 RECTAL BLEED: Status: RESOLVED | Noted: 2018-02-23 | Resolved: 2021-07-15

## 2021-07-15 NOTE — PROGRESS NOTES
Subjective   Nancy Oliver is a 50 y.o. female.  Primary Care: Hyacinth Da Silva APRN  Referring: Hyacinth Da Silva APRN  [address] [id number]      Chief Complaint   Patient presents with   • Hypertension       Patient Active Problem List    Diagnosis Date Noted   • PVC's (premature ventricular contractions) 07/16/2021     Priority: High   • HHD (hypertensive heart disease) 07/16/2021     Priority: High     Note Last Updated: 7/16/2021     · Echo 4-: Technically difficult study.  Normal LV systolic function, EF 55%.  Grade 1 diastolic dysfunction.  Normal valvular morphology.   • Precordial pain      Priority: High   • Palpitations      Priority: High   • Hypertension      Priority: Medium   • Class 3 severe obesity in adult (CMS/Formerly [facility]) 07/16/2021     Priority: Low   • Asthma 07/16/2021   • Psoriatic arthritis (CMS/Formerly [facility]) 07/16/2021   • Sjogren's disease (CMS/Formerly [facility]) 07/16/2021   • Disease of thyroid gland    • Lupus (CMS/Formerly [facility])    • Gastroesophageal reflux disease 02/23/2018        History of Present Illness   This is a 50-year old hypertensive female with no significant prior cardiac history.  She had an echocardiogram in 2019 which was significant only for grade 1 diastolic dysfunction.  A stress test was also ordered at that time but was not approved by her insurance.  She recently presented to [facility] emergency department complaining of smothering which she assumed was from her asthma.  There she had an elevated D-dimer and a very mildly elevated BNP.  Her chest x-ray showed borderline cardiomegaly but no evidence of pulmonary edema.  Her CTA of the chest excluded PE.  There was no pneumonia or pleural effusions.  She was told she had congestive heart failure and started on Lasix and advised to follow-up with her primary care.  She followed up with primary care who agreed she had congestive heart failure and referred to our service for further evaluation.  She continues to have  intermittent chest pain which occurs both with or without exertion over the past 6 months.  She describes it as a midsternal pressure which may last up to 1 hour.  She has associated shortness of breath, diaphoresis and radiating pain left upper extremity but denies nausea.  She states her blood pressure at home typically runs about 150 mmHg systolic.  She has been taking Hytrin as needed diastolic blood pressure greater than 90.  She complains of intermittent heart racing which is brief and not associated with dizziness or syncope.  She continues to have mild lower extremity edema but no current orthopnea or PND.  Her lipids are noted below and are favorable on no medications.  She is not diabetic.  She does not smoke cigarettes.    Past Surgical History:   Procedure Laterality Date   • BRAVO PROCEDURE N/A 2016    Procedure: ESOPHAGOGASTRODUODENOSCOPY AND LINDSEY;  Surgeon: Jorge Carmona MD;  Location: [facility] OR;  Service:    • BRAVO PROCEDURE N/A 3/2/2018    Procedure: ESOPHAGOGASTRODUODENOSCOPY AND LINDSEY;  Surgeon: Jorge Carmona MD;  Location: [facility] OR;  Service:    • CARPAL TUNNEL RELEASE     •  SECTION      x3   • CHOLECYSTECTOMY     • COLONOSCOPY N/A 3/2/2018    Procedure: COLONOSCOPY;  Surgeon: Jorge Carmona MD;  Location: [facility] OR;  Service:    • KIDNEY STONE SURGERY      lithotripsy   • WISDOM TOOTH EXTRACTION         The following portions of the patient's history were reviewed and updated as appropriate: allergies, current medications, past family history, past medical history, past social history, past surgical history and problem list.    Allergies   Allergen Reactions   • Contrave [Naltrexone-Bupropion Hcl Er]    • Keflex [Cephalexin]    • Penicillins    • Sulfa Antibiotics    • Topamax [Topiramate]    • Venlafaxine Hcl          Current Outpatient Medications   Medication Instructions   • acyclovir (ZOVIRAX) 800 MG tablet 1 tablet, Oral, As Needed   • amLODIPine (NORVASC) 10 MG  tablet 1 tablet, Oral, Daily   • aspirin-acetaminophen-caffeine (EXCEDRIN MIGRAINE) 250-250-65 MG per tablet 1 tablet, Oral, Every 6 Hours PRN   • cetirizine (zyrTEC) 10 MG tablet 1 tablet, Oral, Daily   • cloNIDine (CATAPRES) 0.1 MG tablet 1 tablet, Oral, 2 Times Daily   • cyclobenzaprine (FLEXERIL) 10 MG tablet 1 tablet, Oral, As Needed   • DULoxetine (CYMBALTA) 60 mg, Oral, Daily   • Enbrel Mini 50 MG/ML solution cartridge 1 dose, Subcutaneous, Weekly   • furosemide (LASIX) 20 MG tablet 1 tablet, Oral, Every Other Day   • ibuprofen (ADVIL,MOTRIN) 800 MG tablet 1 tablet, Oral, As Needed   • ketorolac (TORADOL) 10 mg, Oral, Every 6 Hours PRN   • leflunomide (ARAVA) 20 MG tablet 1 tablet, Oral, Daily   • levothyroxine (SYNTHROID, LEVOTHROID) 75 mcg, Oral, Daily   • lidocaine (XYLOCAINE) 5 % ointment Apply a pea size amount (1gram) to affected area up to twice daily as needed for pain   • meloxicam (MOBIC) 15 MG tablet 1 tablet, Oral, Daily   • methotrexate 2.5 MG tablet No dose, route, or frequency recorded.   • metoprolol succinate XL (TOPROL-XL) 25 MG 24 hr tablet 1 tablet, Oral, Daily   • ondansetron (ZOFRAN) 4 mg, Oral, Every 8 Hours PRN   • pantoprazole (PROTONIX) 40 MG EC tablet 1 tablet, Oral, Daily   • potassium chloride (K-DUR,KLOR-CON) 10 MEQ CR tablet 1 tablet, Oral, Every Other Day   • Qvar RediHaler 80 MCG/ACT inhaler 1 puff, Inhalation, As Needed   • terazosin (HYTRIN) 5 MG capsule 1 capsule, Oral, As Needed   • tiZANidine (ZANAFLEX) 4 MG tablet 1 tablet, Oral, As Needed   • traMADol (ULTRAM) 50 mg, Oral, Every 6 Hours PRN   • venlafaxine XR (EFFEXOR-XR) 75 MG 24 hr capsule 1 capsule, Oral, Daily   • VITAMIN D, CHOLECALCIFEROL, PO 50,000 Units, Oral, Weekly           Social History     Socioeconomic History   • Marital status: Unknown     Spouse name: Not on file   • Number of children: Not on file   • Years of education: Not on file   • Highest education level: Not on file   Tobacco Use   • Smoking  "status: Never Smoker   • Smokeless tobacco: Never Used   Substance and Sexual Activity   • Alcohol use: No   • Drug use: No   • Sexual activity: Defer       Family History   Problem Relation Age of Onset   • Hypertension Mother    • Heart disease Mother    • Hypertension Father    • Heart disease Father    • Cancer Daughter    • Diabetes Maternal Aunt    • Diabetes Maternal Grandmother    • No Known Problems Sister    • Hypertension Brother    • No Known Problems Brother    • No Known Problems Brother    • No Known Problems Brother        Objective      /92 (BP Location: Right arm, Patient Position: Sitting, Cuff Size: Adult)   Pulse 84   Ht 160 cm (63\")   Wt 123 kg (272 lb)   SpO2 92%   BMI 48.18 kg/m²     Vitals reviewed.   Constitutional:       Appearance: Healthy appearance. Well-developed and not in distress.   Eyes:      Conjunctiva/sclera: Conjunctivae normal.      Pupils: Pupils are equal, round, and reactive to light.   HENT:    Mouth/Throat:      Pharynx: Oropharynx is clear.   Pulmonary:      Effort: Pulmonary effort is normal. No respiratory distress.      Breath sounds: Normal breath sounds. No wheezing. No rales.      Comments: Bases clear  Chest:      Chest wall: Not tender to palpatation.   Cardiovascular:      Normal rate. Regular rhythm.      Murmurs: There is no murmur.      No gallop. No click. No rub.   Pulses:     Intact distal pulses.   Edema:     Pretibial: bilateral trace edema of the pretibial area.     Ankle: bilateral trace edema of the ankle.     Feet: bilateral trace edema of the feet.  Abdominal:      General: Bowel sounds are normal.      Palpations: Abdomen is soft.   Musculoskeletal: Normal range of motion.      Cervical back: Normal range of motion and neck supple. Skin:     General: Skin is warm and dry.   Neurological:      General: No focal deficit present.      Mental Status: Alert.           ECG 12 Lead    Date/Time: 7/16/2021 10:51 AM  Performed by: Gena Dow, " MD  Authorized by: Gena Dow MD   Previous ECG: no previous ECG available  Rhythm: sinus rhythm  Ectopy: unifocal PVCs  Rate: normal  BPM: 96  Conduction: conduction normal  ST Segments: ST segments normal  T inversion: I and aVL    Clinical impression: abnormal EKG            Lab Review:   From primary care dated 5/21/2021:   FLP: Total cholesterol 221, triglycerides 70, HDL 98,   BMP: Sodium 140, potassium 4, chloride 104, CO2 25, glucose 93, BUN 13, creatinine 0.94  CBC: WBCs 5.6, hemoglobin 14.1, hematocrit 43.4, platelets 212  TSH: 4.02    Labs from [facility] emergency department dated 6/4/2021:  BN P: 173  D-dimer: 637  Troponin: Less than 0.012  CBC: WBCs 5.7, hemoglobin 12.9, hematocrit 39.5, platelets 209  COVID-19: Negative  Chest x-ray: Borderline cardiomegaly, lungs are clear.  No other abnormality.  CTA of chest: No pneumonia.  No pleural or pericardial effusions.  No pulmonary embolus.    Result Review:    [x]  Laboratory  [x]  Radiology  [x]  EKG/Telemetry   []  Pathology  [x]  Old records  []  Other:      Assessment:   Diagnosis Plan   1. Precordial pain with both typical and atypical features. Stress Test With Pet Myocardial Perfusion (MULTI STUDY, REST AND STRESS)    Adult Transthoracic Echo Complete W/ Cont if Necessary Per Protocol   2. Palpitations  Adult Transthoracic Echo Complete W/ Cont if Necessary Per Protocol    Holter Monitor - 24 Hour   3. Essential hypertension   discontinue metoprolol and replace with carvedilol 25 mg twice daily.  Ultimately would like to see her off of amlodipine as this is likely contributing to her lower extremity edema.   4. Hypertensive heart disease with diastolic congestive heart failure, unspecified HF chronicity (CMS/HCC)  Adult Transthoracic Echo Complete W/ Cont if Necessary Per Protocol  Change Lasix to 40 mg daily and refill potassium supplementation   5. PVC's (premature ventricular contractions)  Adult Transthoracic Echo Complete W/  Cont if Necessary Per Protocol      Plan:  Assessment and recommendations as above.  Increase Lasix to 40 mg daily, continue rest of the current medications.   Echocardiogram and cardiac PET scan for further assessment of chest pain and diastolic CHF.  24-hour Holter to further assess symptoms of palpitations and frequent ventricular ectopy noted on ECG.  Dash Diet, exercise and weight loss recommended.  Follow-up in 6 weeks in [facility].  Thank you for allowing us to participate in the care of your patient.       Scribed for Gena Dow MD by Electronically signed by Electronically signed by JALEESA Asencio, 07/16/21, 11:34 AM EDT.    I, Gena Dow MD, personally performed the services described in this documentation as scribed by the above named individual in my presence, and it is both accurate and complete.  7/16/2021  16:04 EDT

## 2021-07-16 ENCOUNTER — OFFICE VISIT (OUTPATIENT)
Dept: CARDIOLOGY | Facility: CLINIC | Age: 51
End: 2021-07-16

## 2021-07-16 VITALS
OXYGEN SATURATION: 92 % | HEART RATE: 84 BPM | DIASTOLIC BLOOD PRESSURE: 92 MMHG | BODY MASS INDEX: 48.2 KG/M2 | SYSTOLIC BLOOD PRESSURE: 128 MMHG | HEIGHT: 63 IN | WEIGHT: 272 LBS

## 2021-07-16 DIAGNOSIS — R00.2 PALPITATIONS: ICD-10-CM

## 2021-07-16 DIAGNOSIS — I10 ESSENTIAL HYPERTENSION: ICD-10-CM

## 2021-07-16 DIAGNOSIS — I50.30 HYPERTENSIVE HEART DISEASE WITH DIASTOLIC CONGESTIVE HEART FAILURE, UNSPECIFIED HF CHRONICITY (HCC): ICD-10-CM

## 2021-07-16 DIAGNOSIS — I11.0 HYPERTENSIVE HEART DISEASE WITH DIASTOLIC CONGESTIVE HEART FAILURE, UNSPECIFIED HF CHRONICITY (HCC): ICD-10-CM

## 2021-07-16 DIAGNOSIS — I49.3 PVC'S (PREMATURE VENTRICULAR CONTRACTIONS): ICD-10-CM

## 2021-07-16 DIAGNOSIS — R07.2 PRECORDIAL PAIN: Primary | ICD-10-CM

## 2021-07-16 PROBLEM — M35.00 SJOGREN'S DISEASE (HCC): Status: ACTIVE | Noted: 2021-07-16

## 2021-07-16 PROBLEM — J45.909 ASTHMA: Status: ACTIVE | Noted: 2021-07-16

## 2021-07-16 PROBLEM — I11.9 HHD (HYPERTENSIVE HEART DISEASE): Status: ACTIVE | Noted: 2021-07-16

## 2021-07-16 PROBLEM — E66.01 CLASS 3 SEVERE OBESITY IN ADULT: Status: ACTIVE | Noted: 2021-07-16

## 2021-07-16 PROBLEM — L40.50 PSORIATIC ARTHRITIS (HCC): Status: ACTIVE | Noted: 2021-07-16

## 2021-07-16 PROCEDURE — 93000 ELECTROCARDIOGRAM COMPLETE: CPT | Performed by: INTERNAL MEDICINE

## 2021-07-16 PROCEDURE — 99203 OFFICE O/P NEW LOW 30 MIN: CPT | Performed by: INTERNAL MEDICINE

## 2021-07-16 RX ORDER — MELOXICAM 15 MG/1
1 TABLET ORAL DAILY
COMMUNITY
Start: 2021-06-15 | End: 2022-11-10 | Stop reason: HOSPADM

## 2021-07-16 RX ORDER — AMLODIPINE BESYLATE 10 MG/1
1 TABLET ORAL DAILY
COMMUNITY
Start: 2021-06-15 | End: 2023-03-02

## 2021-07-16 RX ORDER — POTASSIUM CHLORIDE 750 MG/1
1 TABLET, EXTENDED RELEASE ORAL EVERY OTHER DAY
COMMUNITY
Start: 2021-06-07 | End: 2021-07-16 | Stop reason: SDUPTHER

## 2021-07-16 RX ORDER — ACYCLOVIR 800 MG/1
1 TABLET ORAL AS NEEDED
COMMUNITY
Start: 2021-06-01 | End: 2022-05-24

## 2021-07-16 RX ORDER — CLONIDINE HYDROCHLORIDE 0.1 MG/1
1 TABLET ORAL 2 TIMES DAILY
COMMUNITY
Start: 2021-06-15 | End: 2023-03-02

## 2021-07-16 RX ORDER — CETIRIZINE HYDROCHLORIDE 10 MG/1
1 TABLET ORAL DAILY
COMMUNITY
Start: 2021-06-15 | End: 2022-05-24

## 2021-07-16 RX ORDER — LEFLUNOMIDE 20 MG/1
1 TABLET ORAL DAILY
COMMUNITY
Start: 2021-06-15 | End: 2022-11-10 | Stop reason: HOSPADM

## 2021-07-16 RX ORDER — POTASSIUM CHLORIDE 750 MG/1
10 TABLET, EXTENDED RELEASE ORAL EVERY OTHER DAY
Qty: 30 TABLET | Refills: 5 | Status: SHIPPED | OUTPATIENT
Start: 2021-07-16 | End: 2022-08-18

## 2021-07-16 RX ORDER — ETANERCEPT 50 MG/ML
1 SOLUTION SUBCUTANEOUS WEEKLY
COMMUNITY
Start: 2021-06-03 | End: 2022-05-24

## 2021-07-16 RX ORDER — IBUPROFEN 800 MG/1
1 TABLET ORAL AS NEEDED
COMMUNITY
Start: 2021-05-12 | End: 2022-05-24

## 2021-07-16 RX ORDER — FUROSEMIDE 20 MG/1
1 TABLET ORAL EVERY OTHER DAY
COMMUNITY
Start: 2021-06-05 | End: 2021-07-16

## 2021-07-16 RX ORDER — TERAZOSIN 5 MG/1
1 CAPSULE ORAL AS NEEDED
COMMUNITY
Start: 2021-06-07 | End: 2022-05-24

## 2021-07-16 RX ORDER — TIZANIDINE 4 MG/1
1 TABLET ORAL AS NEEDED
COMMUNITY
Start: 2021-05-12 | End: 2022-05-24

## 2021-07-16 RX ORDER — BECLOMETHASONE DIPROPIONATE HFA 80 UG/1
1 AEROSOL, METERED RESPIRATORY (INHALATION) AS NEEDED
COMMUNITY
Start: 2021-06-04 | End: 2022-05-24

## 2021-07-16 RX ORDER — CYCLOBENZAPRINE HCL 10 MG
1 TABLET ORAL AS NEEDED
COMMUNITY
Start: 2021-06-15 | End: 2022-05-24

## 2021-07-16 RX ORDER — PANTOPRAZOLE SODIUM 40 MG/1
1 TABLET, DELAYED RELEASE ORAL DAILY
COMMUNITY
Start: 2021-06-15 | End: 2022-05-24

## 2021-07-16 RX ORDER — VENLAFAXINE HYDROCHLORIDE 75 MG/1
1 CAPSULE, EXTENDED RELEASE ORAL DAILY
COMMUNITY
Start: 2021-06-15 | End: 2022-05-24

## 2021-07-16 RX ORDER — FUROSEMIDE 40 MG/1
40 TABLET ORAL DAILY
Qty: 30 TABLET | Refills: 11 | Status: SHIPPED | OUTPATIENT
Start: 2021-07-16 | End: 2022-08-18

## 2021-07-16 RX ORDER — CARVEDILOL 25 MG/1
25 TABLET ORAL 2 TIMES DAILY
Qty: 180 TABLET | Refills: 3 | Status: SHIPPED | OUTPATIENT
Start: 2021-07-16 | End: 2022-08-18

## 2021-07-16 RX ORDER — METOPROLOL SUCCINATE 25 MG/1
1 TABLET, EXTENDED RELEASE ORAL DAILY
COMMUNITY
Start: 2021-06-05 | End: 2021-07-16

## 2021-07-16 NOTE — PATIENT INSTRUCTIONS
"https://www.nhlbi.nih.gov/files/docs/public/heart/dash_brief.pdf\">   DASH Eating Plan  DASH stands for Dietary Approaches to Stop Hypertension. The DASH eating plan is a healthy eating plan that has been shown to:  · Reduce high blood pressure (hypertension).  · Reduce your risk for type 2 diabetes, heart disease, and stroke.  · Help with weight loss.  What are tips for following this plan?  Reading food labels  · Check food labels for the amount of salt (sodium) per serving. Choose foods with less than 5 percent of the Daily Value of sodium. Generally, foods with less than 300 milligrams (mg) of sodium per serving fit into this eating plan.  · To find whole grains, look for the word \"whole\" as the first word in the ingredient list.  Shopping  · Buy products labeled as \"low-sodium\" or \"no salt added.\"  · Buy fresh foods. Avoid canned foods and pre-made or frozen meals.  Cooking  · Avoid adding salt when cooking. Use salt-free seasonings or herbs instead of table salt or sea salt. Check with your health care provider or pharmacist before using salt substitutes.  · Do not wade foods. Cook foods using healthy methods such as baking, boiling, grilling, roasting, and broiling instead.  · Cook with heart-healthy oils, such as olive, canola, avocado, soybean, or sunflower oil.  Meal planning    · Eat a balanced diet that includes:  ? 4 or more servings of fruits and 4 or more servings of vegetables each day. Try to fill one-half of your plate with fruits and vegetables.  ? 6-8 servings of whole grains each day.  ? Less than 6 oz (170 g) of lean meat, poultry, or fish each day. A 3-oz (85-g) serving of meat is about the same size as a deck of cards. One egg equals 1 oz (28 g).  ? 2-3 servings of low-fat dairy each day. One serving is 1 cup (237 mL).  ? 1 serving of nuts, seeds, or beans 5 times each week.  ? 2-3 servings of heart-healthy fats. Healthy fats called omega-3 fatty acids are found in foods such as walnuts, " flaxseeds, fortified milks, and eggs. These fats are also found in cold-water fish, such as sardines, salmon, and mackerel.  · Limit how much you eat of:  ? Canned or prepackaged foods.  ? Food that is high in trans fat, such as some fried foods.  ? Food that is high in saturated fat, such as fatty meat.  ? Desserts and other sweets, sugary drinks, and other foods with added sugar.  ? Full-fat dairy products.  · Do not salt foods before eating.  · Do not eat more than 4 egg yolks a week.  · Try to eat at least 2 vegetarian meals a week.  · Eat more home-cooked food and less restaurant, buffet, and fast food.  Lifestyle  · When eating at a restaurant, ask that your food be prepared with less salt or no salt, if possible.  · If you drink alcohol:  ? Limit how much you use to:  § 0-1 drink a day for women who are not pregnant.  § 0-2 drinks a day for men.  ? Be aware of how much alcohol is in your drink. In the [place] one drink equals one 12 oz bottle of beer (355 mL), one 5 oz glass of wine (148 mL), or one 1½ oz glass of hard liquor (44 mL).  General information  · Avoid eating more than 2,300 mg of salt a day. If you have hypertension, you may need to reduce your sodium intake to 1,500 mg a day.  · Work with your health care provider to maintain a healthy body weight or to lose weight. Ask what an ideal weight is for you.  · Get at least 30 minutes of exercise that causes your heart to beat faster (aerobic exercise) most days of the week. Activities may include walking, swimming, or biking.  · Work with your health care provider or dietitian to adjust your eating plan to your individual calorie needs.  What foods should I eat?  Fruits  All fresh, dried, or frozen fruit. Canned fruit in natural juice (without added sugar).  Vegetables  Fresh or frozen vegetables (raw, steamed, roasted, or grilled). Low-sodium or reduced-sodium tomato and vegetable juice. Low-sodium or reduced-sodium tomato sauce and tomato paste.  Low-sodium or reduced-sodium canned vegetables.  Grains  Whole-grain or whole-wheat bread. Whole-grain or whole-wheat pasta. Brown rice. Oatmeal. Quinoa. Bulgur. Whole-grain and low-sodium cereals. Keiko bread. Low-fat, low-sodium crackers. Whole-wheat flour tortillas.  Meats and other proteins  Skinless chicken or turkey. Ground chicken or turkey. Pork with fat trimmed off. Fish and seafood. Egg whites. Dried beans, peas, or lentils. Unsalted nuts, nut butters, and seeds. Unsalted canned beans. Lean cuts of beef with fat trimmed off. Low-sodium, lean precooked or cured meat, such as sausages or meat loaves.  Dairy  Low-fat (1%) or fat-free (skim) milk. Reduced-fat, low-fat, or fat-free cheeses. Nonfat, low-sodium ricotta or cottage cheese. Low-fat or nonfat yogurt. Low-fat, low-sodium cheese.  Fats and oils  Soft margarine without trans fats. Vegetable oil. Reduced-fat, low-fat, or light mayonnaise and salad dressings (reduced-sodium). Canola, safflower, olive, avocado, soybean, and sunflower oils. Avocado.  Seasonings and condiments  Herbs. Spices. Seasoning mixes without salt.  Other foods  Unsalted popcorn and pretzels. Fat-free sweets.  The items listed above may not be a complete list of foods and beverages you can eat. Contact a dietitian for more information.  What foods should I avoid?  Fruits  Canned fruit in a light or heavy syrup. Fried fruit. Fruit in cream or butter sauce.  Vegetables  Creamed or fried vegetables. Vegetables in a cheese sauce. Regular canned vegetables (not low-sodium or reduced-sodium). Regular canned tomato sauce and paste (not low-sodium or reduced-sodium). Regular tomato and vegetable juice (not low-sodium or reduced-sodium). Pickles. Olives.  Grains  Baked goods made with fat, such as croissants, muffins, or some breads. Dry pasta or rice meal packs.  Meats and other proteins  Fatty cuts of meat. Ribs. Fried meat. Crowley. Bologna, salami, and other precooked or cured meats, such as  sausages or meat loaves. Fat from the back of a pig (fatback). Bratwurst. Salted nuts and seeds. Canned beans with added salt. Canned or smoked fish. Whole eggs or egg yolks. Chicken or turkey with skin.  Dairy  Whole or 2% milk, cream, and half-and-half. Whole or full-fat cream cheese. Whole-fat or sweetened yogurt. Full-fat cheese. Nondairy creamers. Whipped toppings. Processed cheese and cheese spreads.  Fats and oils  Butter. Stick margarine. Lard. Shortening. Ghee. Crowley fat. Tropical oils, such as coconut, palm kernel, or palm oil.  Seasonings and condiments  Onion salt, garlic salt, seasoned salt, table salt, and sea salt. Worcestershire sauce. Tartar sauce. Barbecue sauce. Teriyaki sauce. Soy sauce, including reduced-sodium. Steak sauce. Canned and packaged gravies. Fish sauce. Oyster sauce. Cocktail sauce. Store-bought horseradish. Ketchup. Mustard. Meat flavorings and tenderizers. Bouillon cubes. Hot sauces. Pre-made or packaged marinades. Pre-made or packaged taco seasonings. Relishes. Regular salad dressings.  Other foods  Salted popcorn and pretzels.  The items listed above may not be a complete list of foods and beverages you should avoid. Contact a dietitian for more information.  Where to find more information  · National Heart, Lung, and Blood [place]: www.nhlbi.nih.gov  · American Heart Association: www.heart.org  · Academy of Nutrition and Dietetics: www.eatright.org  · National Kidney Foundation: www.kidney.org  Summary  · The DASH eating plan is a healthy eating plan that has been shown to reduce high blood pressure (hypertension). It may also reduce your risk for type 2 diabetes, heart disease, and stroke.  · When on the DASH eating plan, aim to eat more fresh fruits and vegetables, whole grains, lean proteins, low-fat dairy, and heart-healthy fats.  · With the DASH eating plan, you should limit salt (sodium) intake to 2,300 mg a day. If you have hypertension, you may need to reduce your  sodium intake to 1,500 mg a day.  · Work with your health care provider or dietitian to adjust your eating plan to your individual calorie needs.  This information is not intended to replace advice given to you by your health care provider. Make sure you discuss any questions you have with your health care provider.  Document Revised: 11/20/2020 Document Reviewed: 11/20/2020  [employer] Patient Education © 2021 [employer]

## 2021-09-22 ENCOUNTER — APPOINTMENT (OUTPATIENT)
Dept: CARDIOLOGY | Facility: HOSPITAL | Age: 51
End: 2021-09-22

## 2021-09-22 ENCOUNTER — HOSPITAL ENCOUNTER (OUTPATIENT)
Dept: CARDIOLOGY | Facility: HOSPITAL | Age: 51
End: 2021-09-22

## 2021-10-22 ENCOUNTER — HOSPITAL ENCOUNTER (OUTPATIENT)
Dept: CARDIOLOGY | Facility: HOSPITAL | Age: 51
End: 2021-10-22

## 2021-10-22 ENCOUNTER — HOSPITAL ENCOUNTER (OUTPATIENT)
Dept: CARDIOLOGY | Facility: HOSPITAL | Age: 51
Discharge: HOME | End: 2021-10-22

## 2021-10-22 VITALS
DIASTOLIC BLOOD PRESSURE: 86 MMHG | SYSTOLIC BLOOD PRESSURE: 129 MMHG | HEIGHT: 63 IN | OXYGEN SATURATION: 96 % | WEIGHT: 272 LBS | BODY MASS INDEX: 48.2 KG/M2 | HEART RATE: 90 BPM

## 2021-10-22 VITALS — HEIGHT: 63 IN | BODY MASS INDEX: 48.05 KG/M2 | WEIGHT: 271.17 LBS

## 2021-10-22 LAB
BH CV REST NUCLEAR ISOTOPE DOSE: 29.9 MCI
BH CV STRESS BP STAGE 1: NORMAL
BH CV STRESS BP STAGE 4: NORMAL
BH CV STRESS COMMENTS STAGE 1: NORMAL
BH CV STRESS DOSE REGADENOSON STAGE 1: 0.4
BH CV STRESS DURATION MIN STAGE 1: 1
BH CV STRESS DURATION MIN STAGE 2: 1
BH CV STRESS DURATION MIN STAGE 3: 1
BH CV STRESS DURATION MIN STAGE 4: 1
BH CV STRESS DURATION SEC STAGE 1: 0
BH CV STRESS DURATION SEC STAGE 2: 0
BH CV STRESS DURATION SEC STAGE 3: 0
BH CV STRESS DURATION SEC STAGE 4: 0
BH CV STRESS HR STAGE 1: 94
BH CV STRESS HR STAGE 2: 120
BH CV STRESS HR STAGE 3: 118
BH CV STRESS HR STAGE 4: 116
BH CV STRESS NUCLEAR ISOTOPE DOSE: 29.9 MCI
BH CV STRESS O2 STAGE 1: 97
BH CV STRESS O2 STAGE 2: 99
BH CV STRESS O2 STAGE 3: 99
BH CV STRESS O2 STAGE 4: 99
BH CV STRESS PROTOCOL 1: NORMAL
BH CV STRESS RECOVERY BP: NORMAL MMHG
BH CV STRESS RECOVERY HR: 108 BPM
BH CV STRESS RECOVERY O2: 97 %
BH CV STRESS STAGE 1: 1
BH CV STRESS STAGE 2: 2
BH CV STRESS STAGE 3: 3
BH CV STRESS STAGE 4: 4
LV EF NUC BP: 58 %
MAXIMAL PREDICTED HEART RATE: 170 BPM
PERCENT MAX PREDICTED HR: 71.18 %
STRESS BASELINE BP: NORMAL MMHG
STRESS BASELINE HR: 90 BPM
STRESS O2 SAT REST: 96 %
STRESS PERCENT HR: 84 %
STRESS POST ESTIMATED WORKLOAD: 1 METS
STRESS POST EXERCISE DUR MIN: 4 MIN
STRESS POST EXERCISE DUR SEC: 0 SEC
STRESS POST O2 SAT PEAK: 99 %
STRESS POST PEAK BP: NORMAL MMHG
STRESS POST PEAK HR: 121 BPM
STRESS TARGET HR: 145 BPM

## 2021-10-22 PROCEDURE — 93306 TTE W/DOPPLER COMPLETE: CPT | Performed by: INTERNAL MEDICINE

## 2021-10-22 PROCEDURE — 93306 TTE W/DOPPLER COMPLETE: CPT

## 2021-10-22 PROCEDURE — 78492 MYOCRD IMG PET MLT RST&STRS: CPT

## 2021-10-22 PROCEDURE — 93018 CV STRESS TEST I&R ONLY: CPT | Performed by: INTERNAL MEDICINE

## 2021-10-22 PROCEDURE — 93017 CV STRESS TEST TRACING ONLY: CPT

## 2021-10-22 PROCEDURE — 0 RUBIDIUM CHLORIDE: Performed by: PHYSICIAN ASSISTANT

## 2021-10-22 PROCEDURE — A9555 RB82 RUBIDIUM: HCPCS | Performed by: PHYSICIAN ASSISTANT

## 2021-10-22 PROCEDURE — 25010000002 REGADENOSON 0.4 MG/5ML SOLUTION: Performed by: PHYSICIAN ASSISTANT

## 2021-10-22 PROCEDURE — 78492 MYOCRD IMG PET MLT RST&STRS: CPT | Performed by: INTERNAL MEDICINE

## 2021-10-22 RX ORDER — CAFFEINE CITRATE 20 MG/ML
60 SOLUTION INTRAVENOUS ONCE
Status: COMPLETED | OUTPATIENT
Start: 2021-10-22 | End: 2021-10-22

## 2021-10-22 RX ADMIN — CAFFEINE CITRATE 60 MG: 20 INJECTION, SOLUTION INTRAVENOUS at 10:40

## 2021-10-22 RX ADMIN — RUBIDIUM CHLORIDE RB-82 1 DOSE: 150 INJECTION, SOLUTION INTRAVENOUS at 10:17

## 2021-10-22 RX ADMIN — RUBIDIUM CHLORIDE RB-82 1 DOSE: 150 INJECTION, SOLUTION INTRAVENOUS at 10:06

## 2021-10-22 RX ADMIN — REGADENOSON 0.4 MG: 0.08 INJECTION, SOLUTION INTRAVENOUS at 10:16

## 2021-10-25 LAB
ASCENDING AORTA: 3.4 CM
BH CV ECHO MEAS - AI DEC SLOPE: 198.6 CM/SEC^2
BH CV ECHO MEAS - AI MAX PG: 54.2 MMHG
BH CV ECHO MEAS - AI MAX VEL: 361.1 CM/SEC
BH CV ECHO MEAS - AI P1/2T: 532.6 MSEC
BH CV ECHO MEAS - AO MAX PG (FULL): 3.7 MMHG
BH CV ECHO MEAS - AO MAX PG: 7.7 MMHG
BH CV ECHO MEAS - AO MEAN PG (FULL): 1.8 MMHG
BH CV ECHO MEAS - AO MEAN PG: 3.7 MMHG
BH CV ECHO MEAS - AO ROOT AREA (BSA CORRECTED): 1.5
BH CV ECHO MEAS - AO ROOT AREA: 8.6 CM^2
BH CV ECHO MEAS - AO ROOT DIAM: 3.3 CM
BH CV ECHO MEAS - AO V2 MAX: 139.1 CM/SEC
BH CV ECHO MEAS - AO V2 MEAN: 87.1 CM/SEC
BH CV ECHO MEAS - AO V2 VTI: 27 CM
BH CV ECHO MEAS - ASC AORTA: 3.4 CM
BH CV ECHO MEAS - AVA(I,A): 2.5 CM^2
BH CV ECHO MEAS - AVA(I,D): 2.5 CM^2
BH CV ECHO MEAS - AVA(V,A): 2.7 CM^2
BH CV ECHO MEAS - AVA(V,D): 2.7 CM^2
BH CV ECHO MEAS - BSA(HAYCOCK): 2.4 M^2
BH CV ECHO MEAS - BSA: 2.2 M^2
BH CV ECHO MEAS - BZI_BMI: 48.2 KILOGRAMS/M^2
BH CV ECHO MEAS - BZI_METRIC_HEIGHT: 160 CM
BH CV ECHO MEAS - BZI_METRIC_WEIGHT: 123.4 KG
BH CV ECHO MEAS - EDV(CUBED): 107.4 ML
BH CV ECHO MEAS - EDV(MOD-SP2): 116 ML
BH CV ECHO MEAS - EDV(MOD-SP4): 96 ML
BH CV ECHO MEAS - EDV(TEICH): 105.1 ML
BH CV ECHO MEAS - EF(CUBED): 68.3 %
BH CV ECHO MEAS - EF(MOD-BP): 58 %
BH CV ECHO MEAS - EF(MOD-SP2): 61.2 %
BH CV ECHO MEAS - EF(MOD-SP4): 58.3 %
BH CV ECHO MEAS - EF(TEICH): 59.8 %
BH CV ECHO MEAS - ESV(CUBED): 34 ML
BH CV ECHO MEAS - ESV(MOD-SP2): 45 ML
BH CV ECHO MEAS - ESV(MOD-SP4): 40 ML
BH CV ECHO MEAS - ESV(TEICH): 42.2 ML
BH CV ECHO MEAS - FS: 31.8 %
BH CV ECHO MEAS - IVS/LVPW: 1.2
BH CV ECHO MEAS - IVSD: 0.92 CM
BH CV ECHO MEAS - LA DIMENSION: 4 CM
BH CV ECHO MEAS - LA/AO: 1.2
BH CV ECHO MEAS - LAD MAJOR: 5.9 CM
BH CV ECHO MEAS - LAT PEAK E' VEL: 10.2 CM/SEC
BH CV ECHO MEAS - LATERAL E/E' RATIO: 8.5
BH CV ECHO MEAS - LV DIASTOLIC VOL/BSA (35-75): 43.6 ML/M^2
BH CV ECHO MEAS - LV IVRT: 0.11 SEC
BH CV ECHO MEAS - LV MASS(C)D: 134.9 GRAMS
BH CV ECHO MEAS - LV MASS(C)DI: 61.2 GRAMS/M^2
BH CV ECHO MEAS - LV MAX PG: 4.1 MMHG
BH CV ECHO MEAS - LV MEAN PG: 2 MMHG
BH CV ECHO MEAS - LV SYSTOLIC VOL/BSA (12-30): 18.2 ML/M^2
BH CV ECHO MEAS - LV V1 MAX: 100.9 CM/SEC
BH CV ECHO MEAS - LV V1 MEAN: 62.8 CM/SEC
BH CV ECHO MEAS - LV V1 VTI: 18.6 CM
BH CV ECHO MEAS - LVIDD: 4.8 CM
BH CV ECHO MEAS - LVIDS: 3.2 CM
BH CV ECHO MEAS - LVLD AP2: 8.2 CM
BH CV ECHO MEAS - LVLD AP4: 7.9 CM
BH CV ECHO MEAS - LVLS AP2: 6.9 CM
BH CV ECHO MEAS - LVLS AP4: 6.7 CM
BH CV ECHO MEAS - LVOT AREA (M): 3.8 CM^2
BH CV ECHO MEAS - LVOT AREA: 3.7 CM^2
BH CV ECHO MEAS - LVOT DIAM: 2.2 CM
BH CV ECHO MEAS - LVPWD: 0.78 CM
BH CV ECHO MEAS - MED PEAK E' VEL: 8.6 CM/SEC
BH CV ECHO MEAS - MEDIAL E/E' RATIO: 10.1
BH CV ECHO MEAS - MV A MAX VEL: 88.3 CM/SEC
BH CV ECHO MEAS - MV DEC SLOPE: 448.2 CM/SEC^2
BH CV ECHO MEAS - MV DEC TIME: 0.21 SEC
BH CV ECHO MEAS - MV E MAX VEL: 87.7 CM/SEC
BH CV ECHO MEAS - MV E/A: 0.99
BH CV ECHO MEAS - MV MAX PG: 4.6 MMHG
BH CV ECHO MEAS - MV MEAN PG: 2.4 MMHG
BH CV ECHO MEAS - MV P1/2T MAX VEL: 82.4 CM/SEC
BH CV ECHO MEAS - MV P1/2T: 53.9 MSEC
BH CV ECHO MEAS - MV V2 MAX: 107.6 CM/SEC
BH CV ECHO MEAS - MV V2 MEAN: 72.2 CM/SEC
BH CV ECHO MEAS - MV V2 VTI: 16.5 CM
BH CV ECHO MEAS - MVA P1/2T LCG: 2.7 CM^2
BH CV ECHO MEAS - MVA(P1/2T): 4.1 CM^2
BH CV ECHO MEAS - MVA(VTI): 4.2 CM^2
BH CV ECHO MEAS - PA ACC SLOPE: 432.5 CM/SEC^2
BH CV ECHO MEAS - PA ACC TIME: 0.14 SEC
BH CV ECHO MEAS - PA MAX PG: 3.6 MMHG
BH CV ECHO MEAS - PA PR(ACCEL): 17.2 MMHG
BH CV ECHO MEAS - PA V2 MAX: 94.7 CM/SEC
BH CV ECHO MEAS - SI(AO): 105.7 ML/M^2
BH CV ECHO MEAS - SI(CUBED): 33.3 ML/M^2
BH CV ECHO MEAS - SI(LVOT): 31.2 ML/M^2
BH CV ECHO MEAS - SI(MOD-SP2): 32.2 ML/M^2
BH CV ECHO MEAS - SI(MOD-SP4): 25.4 ML/M^2
BH CV ECHO MEAS - SI(TEICH): 28.5 ML/M^2
BH CV ECHO MEAS - SV(AO): 232.9 ML
BH CV ECHO MEAS - SV(CUBED): 73.4 ML
BH CV ECHO MEAS - SV(LVOT): 68.7 ML
BH CV ECHO MEAS - SV(MOD-SP2): 71 ML
BH CV ECHO MEAS - SV(MOD-SP4): 56 ML
BH CV ECHO MEAS - SV(TEICH): 62.9 ML
BH CV ECHO MEAS - TAPSE (>1.6): 2.5 CM
BH CV ECHO MEASUREMENTS AVERAGE E/E' RATIO: 9.33
BH CV VAS BP LEFT ARM: NORMAL MMHG
BH CV XLRA - RV BASE: 2.9 CM
BH CV XLRA - RV LENGTH: 7.1 CM
BH CV XLRA - RV MID: 2.6 CM
BH CV XLRA - TDI S': 16 CM/SEC
LEFT ATRIUM VOLUME INDEX: 22.7 ML/M^2
LEFT ATRIUM VOLUME: 50 ML
LV EF 2D ECHO EST: 55 %

## 2021-10-26 ENCOUNTER — OFFICE VISIT (OUTPATIENT)
Dept: CARDIOLOGY | Facility: CLINIC | Age: 51
End: 2021-10-26

## 2021-10-26 VITALS
DIASTOLIC BLOOD PRESSURE: 82 MMHG | HEIGHT: 63 IN | HEART RATE: 88 BPM | SYSTOLIC BLOOD PRESSURE: 126 MMHG | BODY MASS INDEX: 48.9 KG/M2 | WEIGHT: 276 LBS | OXYGEN SATURATION: 95 %

## 2021-10-26 DIAGNOSIS — R07.2 PRECORDIAL PAIN: Primary | ICD-10-CM

## 2021-10-26 DIAGNOSIS — I10 ESSENTIAL HYPERTENSION: ICD-10-CM

## 2021-10-26 DIAGNOSIS — I50.30 HYPERTENSIVE HEART DISEASE WITH DIASTOLIC CONGESTIVE HEART FAILURE, UNSPECIFIED HF CHRONICITY (HCC): ICD-10-CM

## 2021-10-26 DIAGNOSIS — R00.2 PALPITATIONS: ICD-10-CM

## 2021-10-26 DIAGNOSIS — I11.0 HYPERTENSIVE HEART DISEASE WITH DIASTOLIC CONGESTIVE HEART FAILURE, UNSPECIFIED HF CHRONICITY (HCC): ICD-10-CM

## 2021-10-26 PROCEDURE — 99213 OFFICE O/P EST LOW 20 MIN: CPT | Performed by: INTERNAL MEDICINE

## 2021-10-26 RX ORDER — ASPIRIN 81 MG/1
81 TABLET ORAL 2 TIMES DAILY
COMMUNITY
End: 2022-05-24

## 2021-10-26 NOTE — PROGRESS NOTES
[facility] Cardiology    Encounter Date: 10/26/2021    Patient ID: Nancy Oliver is a 51 y.o. female.  : 1970     PCP: Hyacinth Da Silva APRN       Chief Complaint: Precordial pain      PROBLEM LIST:  1. PVCs  a. 24-hour Holter monitor, 2021: SR. Rare PVCs. Average HR: 89. Min HR: 60. Max HR: 138.  2. Hypertensive heart disease with diastolic CHF  a. Echocardiogram, 2019: Technically difficult study. EF 55%. Grade I diastolic dysfunction. Normal valves.  b. Echocardiogram, 10/22/2021: EF 55%. Mild AI.   3. Precordial pain   a. Stress PET, 10/22/2021: EF 58%. Negative for anginal CP.SOB and chest pressure during Lexiscan injection then resolved in early recovery. Abnormal baseline ECG showing NSST changes, no significant change with stress. No evidence of ischemia. Low risk study.  4. Palpitations   a. 24-hour Holter monitor, 2021: SR. Rare PVCs. Average HR: 89. Min HR: 60. Max HR: 138.  5. Hypertension   6. Class III obesity   7. Psoriatic arthritis   8. Sjogren;s disease   9. Disease of thyroid gland   10. Lupus GERD    History of Present Illness  Patient presents today for a 6 week follow-up with a history of precordial pain, palpitations, hypertensive heart disease, PVCs, and cardiac risk factors. Since last visit, the patient has been doing well overall from a cardiovascular standpoint. She's been sick with both a respiratory virus and Coronavirus since being here. She did not need hospitalization with Coronavirus and was treated with steroids. Patient denies chest pain, shortness of breath, palpitations, edema, dizziness, and syncope.       Allergies   Allergen Reactions   • Contrave [Naltrexone-Bupropion Hcl Er]    • Keflex [Cephalexin]    • Penicillins    • Sulfa Antibiotics    • Topamax [Topiramate]    • Venlafaxine Hcl          Current Outpatient Medications:   •  acyclovir (ZOVIRAX) 800 MG tablet, Take 1 tablet by mouth As Needed., Disp: , Rfl:    •  amLODIPine (NORVASC) 10 MG tablet, Take 1 tablet by mouth Daily., Disp: , Rfl:   •  aspirin 81 MG EC tablet, Take 81 mg by mouth 2 (Two) Times a Day., Disp: , Rfl:   •  aspirin-acetaminophen-caffeine (EXCEDRIN MIGRAINE) 250-250-65 MG per tablet, Take 1 tablet by mouth Every 6 (Six) Hours As Needed for headaches., Disp: , Rfl:   •  carvedilol (COREG) 25 MG tablet, Take 1 tablet by mouth 2 (Two) Times a Day., Disp: 180 tablet, Rfl: 3  •  cetirizine (zyrTEC) 10 MG tablet, Take 1 tablet by mouth Daily., Disp: , Rfl:   •  cloNIDine (CATAPRES) 0.1 MG tablet, Take 1 tablet by mouth 2 (Two) Times a Day., Disp: , Rfl:   •  cyclobenzaprine (FLEXERIL) 10 MG tablet, Take 1 tablet by mouth As Needed., Disp: , Rfl:   •  DOXYCYCLINE CALCIUM PO, Take 250 mg by mouth 2 (Two) Times a Day., Disp: , Rfl:   •  DULoxetine (CYMBALTA) 60 MG capsule, Take 60 mg by mouth Daily., Disp: , Rfl:   •  Enbrel Mini 50 MG/ML solution cartridge, Inject 1 dose under the skin into the appropriate area as directed 1 (One) Time Per Week., Disp: , Rfl:   •  furosemide (LASIX) 40 MG tablet, Take 1 tablet by mouth Daily., Disp: 30 tablet, Rfl: 11  •  ibuprofen (ADVIL,MOTRIN) 800 MG tablet, Take 1 tablet by mouth As Needed., Disp: , Rfl:   •  ketorolac (TORADOL) 10 MG tablet, Take 10 mg by mouth Every 6 (Six) Hours As Needed for Moderate Pain ., Disp: , Rfl:   •  leflunomide (ARAVA) 20 MG tablet, Take 1 tablet by mouth Daily., Disp: , Rfl:   •  levothyroxine (SYNTHROID, LEVOTHROID) 75 MCG tablet, Take 75 mcg by mouth Daily., Disp: , Rfl:   •  lidocaine (XYLOCAINE) 5 % ointment, Apply a pea size amount (1gram) to affected area up to twice daily as needed for pain, Disp: , Rfl: 11  •  meloxicam (MOBIC) 15 MG tablet, Take 1 tablet by mouth Daily., Disp: , Rfl:   •  methotrexate 2.5 MG tablet, , Disp: , Rfl: 2  •  ondansetron (ZOFRAN) 4 MG tablet, Take 4 mg by mouth Every 8 (Eight) Hours As Needed for nausea or vomiting., Disp: , Rfl:   •  pantoprazole  "(PROTONIX) 40 MG EC tablet, Take 1 tablet by mouth Daily., Disp: , Rfl:   •  potassium chloride (K-DUR,KLOR-CON) 10 MEQ CR tablet, Take 1 tablet by mouth Every Other Day., Disp: 30 tablet, Rfl: 5  •  Qvar RediHaler 80 MCG/ACT inhaler, Inhale 1 puff As Needed., Disp: , Rfl:   •  terazosin (HYTRIN) 5 MG capsule, Take 1 capsule by mouth As Needed., Disp: , Rfl:   •  tiZANidine (ZANAFLEX) 4 MG tablet, Take 1 tablet by mouth As Needed., Disp: , Rfl:   •  traMADol (ULTRAM) 50 MG tablet, Take 50 mg by mouth Every 6 (Six) Hours As Needed for Moderate Pain ., Disp: , Rfl:   •  venlafaxine XR (EFFEXOR-XR) 75 MG 24 hr capsule, Take 1 capsule by mouth Daily., Disp: , Rfl:   •  VITAMIN D, CHOLECALCIFEROL, PO, Take 50,000 Units by mouth 1 (One) Time Per Week., Disp: , Rfl:     The following portions of the patient's history were reviewed and updated as appropriate: allergies, current medications, past family history, past medical history, past social history, past surgical history and problem list.    ROS  Review of Systems   Constitution: Negative for chills, fever, fatigue, generalized weakness.   Cardiovascular: Negative for chest pain, dyspnea on exertion, leg swelling, palpitations, orthopnea, and syncope.   Respiratory: Negative for cough, shortness of breath, and wheezing.  HENT: Negative for ear pain, nosebleeds, and tinnitus.  Gastrointestinal: Negative for abdominal pain, constipation, diarrhea, nausea and vomiting.   Genitourinary: No urinary symptoms.  Musculoskeletal: Negative for muscle cramps.  Neurological: Negative for dizziness, headaches, loss of balance, numbness, and symptoms of stroke.  Psychiatric: Normal mental status.     All other systems reviewed and are negative.        Objective:     /82 (BP Location: Left arm, Patient Position: Sitting)   Pulse 88   Ht 160 cm (63\")   Wt 125 kg (276 lb)   SpO2 95%   BMI 48.89 kg/m²      Physical Exam  Constitutional: Patient appears well-developed and " well-nourished.   HENT: HEENT exam unremarkable.   Neck: Neck supple. No JVD present. No carotid bruits.   Cardiovascular: Normal rate, regular rhythm and normal heart sounds. No murmur heard.   2+ symmetric pulses.   Pulmonary/Chest: Breath sounds normal. Does not exhibit tenderness.   Abdominal: Abdomen benign.   Musculoskeletal: Does not exhibit edema.   Neurological: Neurological exam unremarkable.   Vitals reviewed.    Data Review:      Lab date: 6/4/2021  • CMP: Glu 90, BUN 13, Creat 0.97, eGFR 61, Na 138, K 4.2, Cl 104, CO2 29, Ca 9.5, Alk Phos 69, AST 35, ALT 23  • BNP: 173  • D-dimer: 637  • Troponin: Less than 0.012  • CBC: WBCs 5.7, hemoglobin 12.9, hematocrit 39.5, platelets 209    From primary care dated 5/21/2021:   · FLP: Total cholesterol 221, triglycerides 70, HDL 98,   · BMP: Sodium 140, potassium 4, chloride 104, CO2 25, glucose 93, BUN 13, creatinine 0.94  · CBC: WBCs 5.6, hemoglobin 14.1, hematocrit 43.4, platelets 212  · TSH: 4.02    Procedures       Assessment:      Diagnosis Plan   1. Precordial pain, resolved. Stable without recurrence and normal stress PET   2. Hypertensive heart disease with diastolic congestive heart failure, unspecified HF chronicity (HCC)  Stable and asymptomatic with normal echo; continue aspirin 81 mg    3. Palpitations  Stable without recurrence and monitor showing rare PVCs; continue carvedilol 25 mg   4. Essential hypertension  Well controlled; continue carvedilol 25 mg, terazosin 5 mg, amlodipine 10 mg, clonidine 0.1 mg, and Lasix 40 mg      Plan:   Stable cardiac status.  Findings of negative work-up discussed and she was reassured.  Continue risk factor management recommended.  Continue current medications.   FU in 12 MO, sooner as needed.  Thank you for allowing us to participate in the care of your patient.     Scribed for Gena Dow MD by Tara Henson. 10/26/2021 13:28 EDT        IGena MD, personally performed the services described in  this documentation as scribed by the above named individual in my presence, and it is both accurate and complete.  10/27/2021  06:47 EDT      Please note that portions of this note may have been completed with a voice recognition program. Efforts were made to edit the dictations, but occasionally words are mistranscribed.

## 2022-05-24 ENCOUNTER — DOCUMENTATION (OUTPATIENT)
Dept: BARIATRICS/WEIGHT MGMT | Facility: CLINIC | Age: 52
End: 2022-05-24

## 2022-05-24 ENCOUNTER — OFFICE VISIT (OUTPATIENT)
Dept: BARIATRICS/WEIGHT MGMT | Facility: CLINIC | Age: 52
End: 2022-05-24

## 2022-05-24 ENCOUNTER — OFFICE VISIT (OUTPATIENT)
Dept: BEHAVIORAL HEALTH | Facility: CLINIC | Age: 52
End: 2022-05-24

## 2022-05-24 VITALS
OXYGEN SATURATION: 97 % | WEIGHT: 281.5 LBS | TEMPERATURE: 97.2 F | DIASTOLIC BLOOD PRESSURE: 80 MMHG | HEIGHT: 64 IN | BODY MASS INDEX: 48.06 KG/M2 | SYSTOLIC BLOOD PRESSURE: 130 MMHG | HEART RATE: 83 BPM

## 2022-05-24 DIAGNOSIS — R06.02 SHORTNESS OF BREATH: ICD-10-CM

## 2022-05-24 DIAGNOSIS — R53.83 FATIGUE, UNSPECIFIED TYPE: ICD-10-CM

## 2022-05-24 DIAGNOSIS — I10 HYPERTENSION, UNSPECIFIED TYPE: Primary | ICD-10-CM

## 2022-05-24 DIAGNOSIS — Z71.89 ENCOUNTER FOR PSYCHOLOGICAL ASSESSMENT PRIOR TO BARIATRIC SURGERY: ICD-10-CM

## 2022-05-24 DIAGNOSIS — G89.29 OTHER CHRONIC PAIN: ICD-10-CM

## 2022-05-24 DIAGNOSIS — E66.01 MORBID OBESITY: Primary | ICD-10-CM

## 2022-05-24 DIAGNOSIS — E66.01 OBESITY, CLASS III, BMI 40-49.9 (MORBID OBESITY): ICD-10-CM

## 2022-05-24 DIAGNOSIS — R06.2 WHEEZING: ICD-10-CM

## 2022-05-24 DIAGNOSIS — F43.21 SITUATIONAL DEPRESSION: ICD-10-CM

## 2022-05-24 DIAGNOSIS — L40.50 PSORIATIC ARTHRITIS: ICD-10-CM

## 2022-05-24 DIAGNOSIS — E07.9 DISEASE OF THYROID GLAND: ICD-10-CM

## 2022-05-24 PROBLEM — G43.909 MIGRAINE: Status: ACTIVE | Noted: 2022-05-24

## 2022-05-24 PROBLEM — A04.8 H. PYLORI INFECTION: Status: ACTIVE | Noted: 2022-05-24

## 2022-05-24 PROBLEM — R51.9 GENERALIZED HEADACHES: Status: ACTIVE | Noted: 2022-05-24

## 2022-05-24 PROBLEM — K21.9 GASTROESOPHAGEAL REFLUX DISEASE WITHOUT ESOPHAGITIS: Status: ACTIVE | Noted: 2022-05-24

## 2022-05-24 PROBLEM — K76.0 FATTY LIVER: Status: ACTIVE | Noted: 2022-05-24

## 2022-05-24 PROBLEM — M79.7 FIBROMYALGIA: Status: ACTIVE | Noted: 2022-05-24

## 2022-05-24 PROBLEM — Z90.49 S/P CHOLECYSTECTOMY: Status: ACTIVE | Noted: 2022-05-24

## 2022-05-24 PROBLEM — K21.00 REFLUX ESOPHAGITIS: Status: ACTIVE | Noted: 2022-05-24

## 2022-05-24 PROCEDURE — 90791 PSYCH DIAGNOSTIC EVALUATION: CPT | Performed by: PSYCHOLOGIST

## 2022-05-24 PROCEDURE — 99204 OFFICE O/P NEW MOD 45 MIN: CPT | Performed by: PHYSICIAN ASSISTANT

## 2022-05-24 RX ORDER — POTASSIUM CHLORIDE 750 MG/1
TABLET, FILM COATED, EXTENDED RELEASE ORAL
COMMUNITY
Start: 2022-04-08 | End: 2022-05-24

## 2022-05-24 RX ORDER — TOFACITINIB 11 MG/1
11 TABLET, FILM COATED, EXTENDED RELEASE ORAL DAILY
COMMUNITY
End: 2022-11-10 | Stop reason: HOSPADM

## 2022-05-24 NOTE — PROGRESS NOTES
[facility] BARIATRIC SURGERY  [address]  [address]  [facility] [id number] [phone]      Patient  Name:  Nancy Oliver  :  1970        Chief Complaint:  weight gain; unable to maintain weight loss    History of Present Illness:  Nancy Oliver is a 51 y.o. female who presents today for evaluation, education and consultation regarding bariatric and metabolic surgery. The patient is interested in sleeve gastrectomy with Dr. Malcolm.     Nancy has been overweight for at least 35 years, has been 35 pounds or more overweight for at least 35 years, has been 100 pounds or more overweight for 20 or more years and started dieting at age 17.      Previous diet attempts include: High Protein, Low Carbohydrate, Low Fat, Calorie Counting, Fasting and Slim Fast; Weight Watchers and DASH; Wellbutrin, Dexatrim, Amphetamines and Prozac.  The most weight Nancy lost was 45 pounds on paleo but was only able to maintain that weight loss for 6 months.  Her maximum lifetime weight is 292 pounds.    As above, patient has been overweight for many years, with numerous failed dietary/weight loss attempts.  She now has obesity related comorbidities and as such has decided to pursue weight loss surgery.    Her cousin had LSG with us 3 years ago and feels great.  Patient is pursuing bariatric surgery to improve overall health.    History of hypertension.  Chronic shortness of breath and wheezing-patient suspects she may have asthma with episodes of bronchitis, has not had evaluation and not treated with inhalers.  Hypothyroidism currently controlled with medication.  Multiple episodes of kidney stones.  Psoriatic arthritis initially evaluated with rheumatologist thought to have lupus at one point but was then told she did not have it. Was dx with Sjogren'sper labs but was negative on biopsy.  Now treated with Xeljanz and Arava and tramadol by PCP for PA.  History of blood transfusion with past  .  History of migraines.    GI:     Has occasional acid reflux treated with Protonix as needed, not needed often.  Remote EGD with noted H. pylori and hiatal hernia.Past history of H. pylori treated in 2019 and retreated in .S/p  cholecystectomy with gallstones.fatty liver.  Denies other GI symptoms.     All other past medical, surgical, social and family history have been obtained and discussed as pertinent to bariatric surgery as below.     Pre-Procedure COVID-19 Questionnaire:    1.  Have you previously been tested for COVID-19?    [x]  No  []  Yes    2.  Were you ever positive for COVID-19?    []  No  [x]  Yes    3.  Are you employed in a healthcare setting?    [x]  No  []  Yes    4.  Are you symptomatic for COVID-19 as defined by the CDC (fever, cough)?  If so, when did symptoms begin?    [x]  No    []  Yes, symptoms began **    5.  Have you been hospitalized for COVID-19?  If so, were you in the ICU?  [x]  No    []  Yes, but not in the ICU    []  Yes, and I was in the ICU    6.  Are you a resident in a congregate (group care setting?)    [x]  No  []  Yes    7.  Are you pregnant?  [x]  No  []  Yes    8.  Are you vaccinated?    [x]  No  []  Yes, but only partially   []  Yes, fully         Past Medical History:   Diagnosis Date   • Asthma 2021   • Bronchitis     seasonal   • Carpal tunnel syndrome    • COVID-19    • Disease of thyroid gland    • Fatty liver    • Fibromyalgia    • Gastroesophageal reflux disease without esophagitis     EGD remotely- hiatal hernia +  hpylori, takes protonix PRN   • Generalized headaches    • H. pylori infection     was treated  and retreated    • History of blood transfusion     with    • History of gallstones    • History of transfusion    • Hypertension    • Kidney stones    • Lupus (HCC)     denied, says was ruled out   • Migraine    • Other specified hypothyroidism    • Psoriatic arthritis (HCC) 2021    on xeljanz and arava treated  "by APRN PCP, saw rheumatologist for dx   • Rectal bleed 2018   • Reflux esophagitis    • S/P cholecystectomy     wtih gallstones   • Shortness of breath    • Wheezing      Past Surgical History:   Procedure Laterality Date   • BRAVO PROCEDURE N/A 2016    Procedure: ESOPHAGOGASTRODUODENOSCOPY AND LINDSEY;  Surgeon: Jorge Carmona MD;  Location: [facility] OR;  Service:    • BRAVO PROCEDURE N/A 2018    Procedure: ESOPHAGOGASTRODUODENOSCOPY AND LINDSEY;  Surgeon: Jorge Carmona MD;  Location: [facility] OR;  Service:    • CARPAL TUNNEL RELEASE     •  SECTION     •  SECTION     •  SECTION     • CHOLECYSTECTOMY      laparoscopic,  gallstones   • COLONOSCOPY N/A 2018    Procedure: COLONOSCOPY;  Surgeon: Jorge Carmona MD;  Location: [facility] OR;  Service:    • KIDNEY STONE SURGERY      lithotripsy   • WISDOM TOOTH EXTRACTION         Allergies   Allergen Reactions   • Penicillins Anaphylaxis   • Contrave [Naltrexone-Bupropion Hcl Er] Other (See Comments)     \"Felt horrible\"   • Keflex [Cephalexin] Rash   • Sulfa Antibiotics Rash   • Topamax [Topiramate] Other (See Comments)     Poor memory   • Venlafaxine Hcl Unknown - Low Severity     Cant remember reaction       Current Outpatient Medications:   •  amLODIPine (NORVASC) 10 MG tablet, Take 1 tablet by mouth Daily., Disp: , Rfl:   •  carvedilol (COREG) 25 MG tablet, Take 1 tablet by mouth 2 (Two) Times a Day., Disp: 180 tablet, Rfl: 3  •  cloNIDine (CATAPRES) 0.1 MG tablet, Take 1 tablet by mouth 2 (Two) Times a Day., Disp: , Rfl:   •  DULoxetine (CYMBALTA) 60 MG capsule, Take 60 mg by mouth Daily., Disp: , Rfl:   •  furosemide (LASIX) 40 MG tablet, Take 1 tablet by mouth Daily., Disp: 30 tablet, Rfl: 11  •  leflunomide (ARAVA) 20 MG tablet, Take 1 tablet by mouth Daily., Disp: , Rfl:   •  meloxicam (MOBIC) 15 MG tablet, Take 1 tablet by mouth Daily., Disp: , Rfl:   •  potassium chloride (K-DUR,KLOR-CON) 10 MEQ CR " tablet, Take 1 tablet by mouth Every Other Day., Disp: 30 tablet, Rfl: 5  •  Tofacitinib Citrate ER (Xeljanz XR) 11 MG tablet sustained-release 24 hour, Take 11 mg by mouth Daily., Disp: , Rfl:   •  traMADol (ULTRAM) 50 MG tablet, Take 50 mg by mouth Every 6 (Six) Hours As Needed for Moderate Pain ., Disp: , Rfl:     Social History     Socioeconomic History   • Marital status:    Tobacco Use   • Smoking status: Never Smoker   • Smokeless tobacco: Never Used   Vaping Use   • Vaping Use: Never used   Substance and Sexual Activity   • Alcohol use: No   • Drug use: No   • Sexual activity: Defer     Family History   Problem Relation Age of Onset   • Hypertension Mother    • Heart disease Mother    • Hypertension Father    • Heart disease Father    • Cancer Daughter    • Diabetes Maternal Aunt    • Diabetes Maternal Grandmother    • No Known Problems Sister    • Hypertension Brother    • No Known Problems Brother    • No Known Problems Brother    • No Known Problems Brother        Review of Systems:  Constitutional:  Reports fatigue, weight gain and denies fevers, chills.  HEENT:  denies headache, ear pain or loss of hearing, blurred or double vision, nasal discharge or sore throat.  Cardiovascular:  Reports HTN, edema and denies HLD, CAD, Atrial Fib, hx heart disease, heart murmur, hx MI, chest pain, palpitations, hx DVT.  Respiratory:  Reports shortness of breath, wheezing  and denies cough , wheezing, sleep apnea, asthma, COPD, hx PE.  Gastrointestinal:  Reports heartburn, diarrhea, liver disease and denies dysphagia, nausea, vomiting, abdominal pain, IBS, constipation, melena, blood in stool, hx pancreatitis.  Genitourinary:  Reports none and denies history of  frequent UTI, incontinence, hematuria, dysuria, polyuria, polydipsia, renal insufficiency, renal failure.    Musculoskeletal:  Reports joint pain, back pain, fibromyalgia, arthritis, autoimmune disease   Neurological:  Reports headaches and denies  numbness /tingling, dizziness, confusion, seizure, stroke.  Psychiatric:  Reports none and denies depressed mood, hx depression, feeling anxious, hx anxiety, bipolar disorder, suicidal ideation, hx suicide attempt, hx self injury, hx substance abuse, eating disorder.  Endocrine:  Reports thyroid disease and denies PCOS, endometriosis, glucose intolerance, diabetes, gout.  Hematologic:  Reports hx blood transfusion and denies bruising, bleeding disorder, hx anemia.  Skin:  Reports none and denies rashes, hx MRSA.      Physical Exam:  Vital Signs:  Weight: 128 kg (281 lb 8 oz)   Body mass index is 48.32 kg/m².  Temp: 97.2 °F (36.2 °C)   Heart Rate: 83   BP: 130/80     Physical Exam  Vitals reviewed.   Constitutional:       Appearance: She is well-developed. She is obese.   HENT:      Head: Normocephalic.   Neck:      Thyroid: No thyromegaly.   Cardiovascular:      Rate and Rhythm: Normal rate and regular rhythm.      Heart sounds: Normal heart sounds.   Pulmonary:      Effort: Pulmonary effort is normal. No respiratory distress.      Breath sounds: Normal breath sounds. No wheezing.   Abdominal:      General: Bowel sounds are normal. There is no distension.      Palpations: Abdomen is soft.      Tenderness: There is no abdominal tenderness.      Comments: Lap scars   lower midline scar  Hyperpigmented scarred lesions at waistline skin fold   Musculoskeletal:         General: Normal range of motion.      Cervical back: Normal range of motion and neck supple.   Skin:     General: Skin is warm and dry.   Neurological:      Mental Status: She is alert and oriented to person, place, and time.   Psychiatric:         Mood and Affect: Mood normal.         Behavior: Behavior normal.         Thought Content: Thought content normal.         Judgment: Judgment normal.         Patient Active Problem List   Diagnosis   • Gastroesophageal reflux disease   • Hypertension   • Disease of thyroid gland   • Lupus (HCC)   • Precordial  pain   • Palpitations   • Asthma   • PVC's (premature ventricular contractions)   • HHD (hypertensive heart disease)   • Psoriatic arthritis (HCC)   • Sjogren's disease (HCC)   • Class 3 severe obesity in adult (HCC)   • Fatty liver   • Fibromyalgia   • Gastroesophageal reflux disease without esophagitis   • Generalized headaches   • H. pylori infection   • History of blood transfusion   • Migraine   • Reflux esophagitis   • S/P cholecystectomy   • Shortness of breath   • Wheezing       Assessment:    Nancy Oliver is a 51 y.o. year old female with medically complicated obesity pursuing sleeve gastrectomy.    Weight loss surgery is deemed medically necessary given the following obesity related comorbidities including hypertension, cardiovascular disease, back pain, knee pain, fibromyalgia, GERD, gall bladder disease and edema with current Weight: 128 kg (281 lb 8 oz) and Body mass index is 48.32 kg/m²..    Plan:  The consultation plan and program requirements were reviewed with the patient.  The patient has been advised that a letter of medical support must be obtained from her primary care physician or referring provider. A psychological evaluation will be arranged.  A nutritional evaluation will be performed.  The patient was advised to start a high protein and low carbohydrate diet.  Necessary lifestyle modifications were discussed.  Instructions on how to access KRYSTAL was given to the patient.  KRYSTAL is an internet based educational video that explains the surgical procedure chosen and answers basic questions regarding that procedure.     BMI has not been calculated during today's encounter.         Preoperative testing will include: CBC, CMP, Lipids, TSH, HgA1C, H.Pylori UBT, EKG, CXR, Pulmonary Function Testing and EGD (needs cardiac clearance first)    The risks and benefits of the upper endoscopy were discussed with the patient in detail and all questions were answered.  Possibility of perforation,  bleeding, aspiration, and anesthesia reaction were reviewed.  Patient agrees to proceed.      Additional preop clearances required prior to surgery: Cardiology, PCP clearance to hold immunomodulators.      The patient has been educated on expected postoperative lifestyle changes, including commitment to high protein diet, vitamin regimen, and exercise program.  They are aware that support groups are encouraged for optimal weight loss results. Patient understands that bariatric surgery is not cosmetic surgery but rather a tool to help make a lifelong commitment to lifestyle changes including diet, exercise, behavior modifications, and healthy habits. The procedure was discussed with the patient and all questions were answered. The importance of avoiding ASA/ NSAIDS/ steroids/ tobacco/ hormones/ immunomodulators perioperatively was discussed.         Shobha Chan PA-C

## 2022-05-24 NOTE — PROGRESS NOTES
PROGRESS NOTE    Data:    Nancy Oliver is a 51 y.o. female who met with the undersigned for a scheduled psychological evaluation from 9:00 - 9:45am.      Clinical Maneuvering/Intervention:      Chief complaint and history of presenting illness/Problems: struggling with obesity for several years. Despite trying different weight loss plans and diets, the pt reported being unsuccessful in losing weight. A psychological evaluation was conducted in order to assess past and current level of functioning. Areas assessed included, but were not limited to: perception of social support, perception of ability to face and deal with challenges in life (positive functioning), anxiety symptoms, depressive symptoms, perspective on beliefs/belief system, coping skills for stress, intelligence level, addiction issues, etc. Therapeutic rapport was established. Interventions conducted today were geared towards assessing the pt's readiness for weight loss surgery and identifying and psychological contraindications for undergoing such a major life change. Social support was deemed strong (specific to weight loss surgery/weight loss in this manner and in a general sense): , children, friends, and other family members. Current psychological struggles were described as low, but included: depression situational to being overweight and having chronic arthritis and fibromyalgia pain. Coping skills for distress and related to undergoing a major life change such as weight loss surgery/weight loss were deemed strong and included being level-headed/not easily stressed in life, maintains a healthy marriage, good sense of humor, follows directions well, responsible person, maintains quality, relationships with others, and believes in herself that she will be successful with weight loss surgery. The pt endorsed having characteristics of readiness to undergo major life changes inherent in the journey of weight loss surgery. She could speak  to having 'suffered enough,' and the decision to have weight loss surgery has 'clicked' for her. The pt expressed gratitude for today's visit.     Past Family and Social History:      History of family mental health problems: two brothers (substance abuse)    Psychosocial history: treatment of psychiatric care in the past (N/A), alcohol/substance abuse treatment in the past (N/A) , alcohol/substance abuse problems (N/A), inpatient psychiatric care (N/A).    Mental Status Exam (MSE):  Hygiene:  good  Dress: normal  Attitude:  cooperative and proactive  Motor Activity: normal  Speech: normal  Mood:   excited  Affect:  congruent  Thought Processes: normal  Thought Content:  normal  Suicidal Thoughts:  not endorsed  Homicidal Thoughts:  not endorsed  Crisis Safety Plan: not needed   Hallucinations:  none      Patient's Support Network Includes:  family, friends      Progress toward goal: there is evidence to suggest that she is taking measures to improve the quality of her life including seeking weight loss surgery.       Functional Status: moderate to high      Prognosis: good with weight loss surgery    Evaluation, Diagnoses, and Ability/Capacity to Respond to Treatment:      The pt presented to be struggling with depression situational to obesity (BMI = 48.3, morbid obesity) and chronic pain. Results of MSE demonstrated a functional status of moderate to high. Strengths: belief in self that she will be successful with weight loss surgery, etc (see detailed list of coping skills above). Needed for growth (CPT code requirement for Weaknesses): weight loss.      From a psychological standpoint, the pt presents as a good candidate for bariatric surgery. She is motivated for the surgery, has showed readiness for the lifestyle change in terms of starting to adjust her eating habits, and seems to have appropriate expectations of how to prepare and how to live after surgery in order to lose weight successfully.    Treatment  Plan:      Short term goals: Start improving her health by following up with her bariatric surgeon in order to receive weight loss surgery as soon as feasible/appropriate and demonstrate success with compliance to adhering to the recommended diet. Long term goals: reach a healthy weight, potentially experience alleviation of physical pains, and reach remittance of depression via taking control over her health.    Linda Bojorquez, PhD, LP

## 2022-05-24 NOTE — PROGRESS NOTES
"Weight Loss Surgery  Presurgical Nutrition Assessment     Nacny Oliver  2022  [id number]  [id number]  1970  female    Surgery desired: Sleeve Gastrectomy    Height: 162.6 cm (64\")  Weight: 128 kg (281.5 #)  BMI: 48.32    Past Medical History:   Diagnosis Date   • Asthma 2021   • Bronchitis     seasonal   • Carpal tunnel syndrome    • COVID-19    • Disease of thyroid gland    • Fibromyalgia    • History of gallstones    • History of transfusion    • Hypertension    • Kidney stones    • Lupus (HCC)    • Migraine    • Psoriatic arthritis (HCC) 2021   • Rectal bleed 2018   • Reflux esophagitis      Past Surgical History:   Procedure Laterality Date   • BRAVO PROCEDURE N/A 2016    Procedure: ESOPHAGOGASTRODUODENOSCOPY AND LINDSEY;  Surgeon: Jorge Carmona MD;  Location: [facility] OR;  Service:    • BRAVO PROCEDURE N/A 3/2/2018    Procedure: ESOPHAGOGASTRODUODENOSCOPY AND LINDSEY;  Surgeon: Jorge Carmona MD;  Location: [facility] OR;  Service:    • CARPAL TUNNEL RELEASE     •  SECTION      x3   • CHOLECYSTECTOMY     • COLONOSCOPY N/A 3/2/2018    Procedure: COLONOSCOPY;  Surgeon: Jorge Carmona MD;  Location: [facility] OR;  Service:    • KIDNEY STONE SURGERY      lithotripsy   • WISDOM TOOTH EXTRACTION       Allergies   Allergen Reactions   • Contrave [Naltrexone-Bupropion Hcl Er]    • Keflex [Cephalexin]    • Penicillins    • Sulfa Antibiotics    • Topamax [Topiramate]    • Venlafaxine Hcl        Current Outpatient Medications:   •  acyclovir (ZOVIRAX) 800 MG tablet, Take 1 tablet by mouth As Needed., Disp: , Rfl:   •  amLODIPine (NORVASC) 10 MG tablet, Take 1 tablet by mouth Daily., Disp: , Rfl:   •  aspirin 81 MG EC tablet, Take 81 mg by mouth 2 (Two) Times a Day., Disp: , Rfl:   •  aspirin-acetaminophen-caffeine (EXCEDRIN MIGRAINE) 250-250-65 MG per tablet, Take 1 tablet by mouth Every 6 (Six) Hours As Needed for headaches., Disp: , Rfl:   •  carvedilol (COREG) 25 MG tablet, Take 1 " tablet by mouth 2 (Two) Times a Day., Disp: 180 tablet, Rfl: 3  •  cetirizine (zyrTEC) 10 MG tablet, Take 1 tablet by mouth Daily., Disp: , Rfl:   •  cloNIDine (CATAPRES) 0.1 MG tablet, Take 1 tablet by mouth 2 (Two) Times a Day., Disp: , Rfl:   •  cyclobenzaprine (FLEXERIL) 10 MG tablet, Take 1 tablet by mouth As Needed., Disp: , Rfl:   •  DOXYCYCLINE CALCIUM PO, Take 250 mg by mouth 2 (Two) Times a Day., Disp: , Rfl:   •  DULoxetine (CYMBALTA) 60 MG capsule, Take 60 mg by mouth Daily., Disp: , Rfl:   •  Enbrel Mini 50 MG/ML solution cartridge, Inject 1 dose under the skin into the appropriate area as directed 1 (One) Time Per Week., Disp: , Rfl:   •  furosemide (LASIX) 40 MG tablet, Take 1 tablet by mouth Daily., Disp: 30 tablet, Rfl: 11  •  ibuprofen (ADVIL,MOTRIN) 800 MG tablet, Take 1 tablet by mouth As Needed., Disp: , Rfl:   •  ketorolac (TORADOL) 10 MG tablet, Take 10 mg by mouth Every 6 (Six) Hours As Needed for Moderate Pain ., Disp: , Rfl:   •  leflunomide (ARAVA) 20 MG tablet, Take 1 tablet by mouth Daily., Disp: , Rfl:   •  levothyroxine (SYNTHROID, LEVOTHROID) 75 MCG tablet, Take 75 mcg by mouth Daily., Disp: , Rfl:   •  lidocaine (XYLOCAINE) 5 % ointment, Apply a pea size amount (1gram) to affected area up to twice daily as needed for pain, Disp: , Rfl: 11  •  meloxicam (MOBIC) 15 MG tablet, Take 1 tablet by mouth Daily., Disp: , Rfl:   •  methotrexate 2.5 MG tablet, , Disp: , Rfl: 2  •  ondansetron (ZOFRAN) 4 MG tablet, Take 4 mg by mouth Every 8 (Eight) Hours As Needed for nausea or vomiting., Disp: , Rfl:   •  pantoprazole (PROTONIX) 40 MG EC tablet, Take 1 tablet by mouth Daily., Disp: , Rfl:   •  potassium chloride (K-DUR,KLOR-CON) 10 MEQ CR tablet, Take 1 tablet by mouth Every Other Day., Disp: 30 tablet, Rfl: 5  •  Qvar RediHaler 80 MCG/ACT inhaler, Inhale 1 puff As Needed., Disp: , Rfl:   •  terazosin (HYTRIN) 5 MG capsule, Take 1 capsule by mouth As Needed., Disp: , Rfl:   •  tiZANidine  (ZANAFLEX) 4 MG tablet, Take 1 tablet by mouth As Needed., Disp: , Rfl:   •  traMADol (ULTRAM) 50 MG tablet, Take 50 mg by mouth Every 6 (Six) Hours As Needed for Moderate Pain ., Disp: , Rfl:   •  venlafaxine XR (EFFEXOR-XR) 75 MG 24 hr capsule, Take 1 capsule by mouth Daily., Disp: , Rfl:   •  VITAMIN D, CHOLECALCIFEROL, PO, Take 50,000 Units by mouth 1 (One) Time Per Week., Disp: , Rfl:       Nutrition Assessment    Estimated energy needs:  1885 kcal    Estimated calories for weight loss:  1400 kcal    IBW (Pounds):  145 #        Excess body weight (Pounds):  135 #       Nutrition Recall  24 Hour recall: (B) (L) (D) -  Reviewed and discussed with patient, who states she drinks 3-twelve oz cans of eigther diet Pepsi or diet Mt Dew qd. Often drinks at least an 10 oz glassful of whole milk.  Breakfast @ 9 am = 3 eggs scrambled with 1 tsp butter & 2 slices toast /c 1 cup coffee /c 4 tbsp sugar free French vanilla creamer. Mid-day snack = 1 Nutty Buddy + 2 snicker doodle cookies.  Dinner = 1 pork chop.  Evening snack = 1 oz white chocolate chips + 10 oz whole milk.  Diet is low in protein, high in processed carbohydrate and lacking fruits and vegetables. Patient will focus on ingesting adequate protein for successful weight loss in 3 regular meals + 2 to 3 high protein snacks per day.  She will wean herself off of all soda.     Exercise  none now, but plans to walk 30' per day       Education    Provided information packet re:  Sleeve Gastrectomy  1. Reviewed guidelines for higher protein, limited carbohydrate diet to promote weight loss.  Encouraged patient to incorporate these principles of healthy eating from now until approximately 2 weeks prior to bariatric surgery date, when an even lower carbohydrate “liver-shrinking” regimen will be followed. (Information sheet re pre-op diet given).  Explained that after recovery from surgery this diet will again be followed to ensure further loss and for weight maintenance.     2. Encouraged patient to choose an acceptable protein supplement powder or shake for post-surgery liquid diet.  Provided product guidelines and examples.    3. Explained importance of goal setting to help in changing eating behaviors that are not conducive to weight loss.  Targeted several on a worksheet which also included spaces for patient to work on issues specific to them.  4. Provided follow-up options for support, including contact information for dietitians here, if desired.  Web-based support information and apps for smart phones and computers given.  Noted that monthly support group is offered at this clinic, and that support is associated with successful weight loss.    Recommend that team proceed with surgery and follow per protocol.      Nutrition Goals   Dietary Guidelines per information packet as described above  Protein goal:  grams per day   Carbohydrate goal:  100-140 grams per day  Eliminate soda, sweet tea, etc.     Exercise Goals  Continue current exercise routine   Add 15-30 minutes of activity per day as tolerated      Abigail Heller, KYLER  05/24/2022  08:58 EDT

## 2022-05-26 ENCOUNTER — TELEPHONE (OUTPATIENT)
Dept: CARDIOLOGY | Facility: CLINIC | Age: 52
End: 2022-05-26

## 2022-05-26 NOTE — TELEPHONE ENCOUNTER
Per Lindy Xiong PA-C: Ok to clear         05/26/22        Re:  Nancy Lety Oliver, 1970        Nancy Lety Oliver, 1970 is LOW RISK  for scheduled procedure/surgery from a cardiac/EP standpoint.  Any questions please call [phone].

## 2022-05-26 NOTE — TELEPHONE ENCOUNTER
Pt needing CC for bariatric surgery w Dr. Malcolm. Last seen 10/2021 - echo and stress done, both unremarkable. Pt reports no issues since last office visit. Next FU 11/2022. Not on any anticoagulation. Ok to clear?

## 2022-06-16 ENCOUNTER — OFFICE VISIT (OUTPATIENT)
Dept: PULMONOLOGY | Facility: CLINIC | Age: 52
End: 2022-06-16

## 2022-06-16 VITALS
OXYGEN SATURATION: 95 % | HEIGHT: 64 IN | SYSTOLIC BLOOD PRESSURE: 124 MMHG | BODY MASS INDEX: 48.32 KG/M2 | WEIGHT: 283 LBS | TEMPERATURE: 98 F | DIASTOLIC BLOOD PRESSURE: 76 MMHG | HEART RATE: 78 BPM

## 2022-06-16 DIAGNOSIS — J41.1 BRONCHITIS, MUCOPURULENT RECURRENT: ICD-10-CM

## 2022-06-16 DIAGNOSIS — Z01.818 PREOPERATIVE CLEARANCE: Primary | ICD-10-CM

## 2022-06-16 DIAGNOSIS — J30.89 ENVIRONMENTAL AND SEASONAL ALLERGIES: ICD-10-CM

## 2022-06-16 PROCEDURE — 94010 BREATHING CAPACITY TEST: CPT | Performed by: NURSE PRACTITIONER

## 2022-06-16 PROCEDURE — 94726 PLETHYSMOGRAPHY LUNG VOLUMES: CPT | Performed by: NURSE PRACTITIONER

## 2022-06-16 PROCEDURE — 99204 OFFICE O/P NEW MOD 45 MIN: CPT | Performed by: NURSE PRACTITIONER

## 2022-06-16 PROCEDURE — 94729 DIFFUSING CAPACITY: CPT | Performed by: NURSE PRACTITIONER

## 2022-06-16 RX ORDER — ALBUTEROL SULFATE 90 UG/1
2 AEROSOL, METERED RESPIRATORY (INHALATION) EVERY 4 HOURS PRN
COMMUNITY

## 2022-06-17 PROBLEM — J41.1 BRONCHITIS, MUCOPURULENT RECURRENT (HCC): Status: ACTIVE | Noted: 2022-06-17

## 2022-06-17 PROBLEM — J45.909 ASTHMA: Status: RESOLVED | Noted: 2021-07-16 | Resolved: 2022-06-17

## 2022-06-17 PROBLEM — J30.89 ENVIRONMENTAL AND SEASONAL ALLERGIES: Status: ACTIVE | Noted: 2022-06-17

## 2022-06-17 RX ORDER — PANTOPRAZOLE SODIUM 40 MG/1
40 TABLET, DELAYED RELEASE ORAL DAILY
COMMUNITY
End: 2023-03-02

## 2022-06-17 RX ORDER — CETIRIZINE HYDROCHLORIDE 10 MG/1
10 TABLET ORAL DAILY
COMMUNITY
End: 2023-03-02

## 2022-06-17 NOTE — PROGRESS NOTES
"[facility] Pulmonary Evaluation    Chief Complaint  Pre-op Exam and Follow-up    Referring Provider:     Jeanette          Nancy Lety Oliver presents to [facility] for preoperative pulmonary evaluation.  She plans to undergo bariatric surgery with a gastric sleeve.    She does have a history of recurrent bronchitis around 2-3 times a year.  During this episode she required antibiotic and steroid.  She also uses a rescue inhaler or albuterol nebulizers when she is acutely ill.  She does have quite a bit of seasonal and environmental allergens, the exacerbations of her bronchitis are usually around the seasons or exposures.  Currently she does take an antihistamine as needed.    She did have COVID-19 in September of last year.  She did have some respiratory issues and completed a course of antibiotics and steroids.  She feels like her breathing went back to normal but she can still not taste or smell.    She was also in the emergency department last year for heart failure and heart failure symptoms.  She has had some uncontrolled blood pressure.  She is now following up with cardiology and blood pressure is much better.  She is intolerant to ACE inhibitors with the cough.    She is a non-smoker.  She did have some secondhand smoke exposure.  No history of wood or coal burning fireplaces.  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  No significant family history of lung disease.    She has had surgery in the past most recently in 2019 she had her gallbladder removed.  She has had no issues with anesthesia.    She did have a sleep study 2 to 3 years ago which was negative.    Objective     Vital Signs:   /76   Pulse 78   Temp 98 °F (36.7 °C)   Ht 162.6 cm (64\")   Wt 128 kg (283 lb)   SpO2 95% Comment: resting, room air  BMI 48.58 kg/m²       Immunization History   Administered Date(s) Administered   • Hepatitis A 12/17/2018       Physical " Exam  Vitals reviewed.   Constitutional:       Appearance: She is well-developed.   HENT:      Head: Normocephalic and atraumatic.   Eyes:      Pupils: Pupils are equal, round, and reactive to light.   Cardiovascular:      Rate and Rhythm: Normal rate and regular rhythm.      Heart sounds: No murmur heard.  Pulmonary:      Effort: Pulmonary effort is normal. No respiratory distress.      Breath sounds: Normal breath sounds. No wheezing or rales.   Abdominal:      General: Bowel sounds are normal. There is no distension.      Palpations: Abdomen is soft.   Musculoskeletal:         General: Normal range of motion.      Cervical back: Normal range of motion and neck supple.   Skin:     General: Skin is warm and dry.      Findings: No erythema.   Neurological:      Mental Status: She is alert and oriented to person, place, and time.   Psychiatric:         Behavior: Behavior normal.          Result Review :            PFTs done in the office today:  No obstruction or restriction, normal PFTs FVC was 2.96, 84% predicted.  Normal adjusted DLCO.      PA and lateral chest x-ray done the office today reviewed by me  Awaiting final MD interpretation                 Assessment and Plan    Problem List Items Addressed This Visit        Allergies and Adverse Reactions    Environmental and seasonal allergies       Pulmonary and Pneumonias    Bronchitis, mucopurulent recurrent (HCC)    Relevant Medications    albuterol sulfate  (90 Base) MCG/ACT inhaler    cetirizine (zyrTEC) 10 MG tablet      Other Visit Diagnoses     Preoperative clearance    -  Primary    Relevant Orders    XR Chest PA & Lateral    Pulmonary Function Test        Today's PFTs are normal.  She does have episodes of recurrent bronchitis with cough and wheezing that requires antibiotics and steroids.  She likely has some allergic asthma symptoms.  We did discuss following back up here in the office if she has any of these episodes again, we discussed the  benefit of adding on long-acting medications to help her through the seasons to keep her from becoming acutely ill.    We did discuss the importance of if she does have an acute exacerbation prior to surgery to make sure her surgeon and is sees urologist is aware.    In regards to her preoperative evaluation for bariatric surgery she would be at low risk.    ARISCAT Preoperative Pulmonary Risk Index.    Age [x]   <= 50 years old (0 points)    []   51-80 years old (3 points)    []   >80 years old (16 points)       Preoperative Oxygen Saturation [x]   >= 96% (0 points)    []   91-95% (8 points)    []   <= 90% (24 points)       Other Clinical Risk Factors []   Respiratory Infection in the last month (17 points)    []   Pre-operative anemia: Hb < 10 g/dL (11 points)    []   Emergency Surgery (8 points)       Surgical Incision [x]   Upper abdominal (15 points)    []   Intrathoracic (24 points)       Duration of Surgery [x]   < 2 hours (0 points)    []   2-3 hours (16 points)    []   >3 hours (23 points)       Total Criteria Point Count: 15     ARISCAT risk index interpretation:      0-25 points: Low risk  1.6 % pulmonary complication rate.   26-44 points: Intermediate risk 13.3% pulmonary complication rate.    points: High risk 42.1 % pulmonary complication rate.     Postoperative Pulmonary Complications include but are not limited to: Respiratory Failure, Pulmonary Infection, Pleural Effusion, Atelectasis, Pneumothorax Bronchospasm, Aspiration Pneumonia.          I spent 45 minutes caring for Nancy on this date of service. This time includes time spent by me in the following activities:preparing for the visit, reviewing tests, obtaining and/or reviewing a separately obtained history, performing a medically appropriate examination and/or evaluation , counseling and educating the patient/family/caregiver, ordering medications, tests, or procedures, referring and communicating with other health care professionals ,  documenting information in the medical record and independently interpreting results and communicating that information with the patient/family/caregiver    Follow Up     No follow-ups on file.  Patient was given instructions and counseling regarding her condition or for health maintenance advice. Please see specific information pulled into the AVS if appropriate.     HAZEL Gooden, ACNP  [facility]  Electronically signed

## 2022-07-19 DIAGNOSIS — K21.9 GASTROESOPHAGEAL REFLUX DISEASE, UNSPECIFIED WHETHER ESOPHAGITIS PRESENT: Primary | ICD-10-CM

## 2022-07-19 DIAGNOSIS — Z11.52 ENCOUNTER FOR SCREENING FOR COVID-19: ICD-10-CM

## 2022-07-25 ENCOUNTER — TELEMEDICINE (OUTPATIENT)
Dept: BARIATRICS/WEIGHT MGMT | Facility: CLINIC | Age: 52
End: 2022-07-25

## 2022-07-25 VITALS — WEIGHT: 269 LBS | BODY MASS INDEX: 45.93 KG/M2 | HEIGHT: 64 IN

## 2022-07-25 DIAGNOSIS — K21.9 GASTROESOPHAGEAL REFLUX DISEASE, UNSPECIFIED WHETHER ESOPHAGITIS PRESENT: Primary | ICD-10-CM

## 2022-07-25 PROBLEM — R53.83 FATIGUE: Status: ACTIVE | Noted: 2022-07-25

## 2022-07-25 PROBLEM — R10.13 DYSPEPSIA: Status: ACTIVE | Noted: 2022-07-25

## 2022-07-25 PROCEDURE — 99214 OFFICE O/P EST MOD 30 MIN: CPT | Performed by: PHYSICIAN ASSISTANT

## 2022-07-25 NOTE — PROGRESS NOTES
"[facility] BARIATRIC SURGERY   [address]  [address]  [facility] [id number] [phone]      Patient  Name:  Nancy Oliver  :  1970        Chief Complaint:  GERD      History of Present Illness:  Nancy Oliver is a 51 y.o. female pursuing MBS w/ Dr. Malcolm.    Initial Intake Eval 22:  \"Nancy has been overweight for at least 35 years, has been 35 pounds or more overweight for at least 35 years, has been 100 pounds or more overweight for 20 or more years and started dieting at age 17.      Previous diet attempts include: High Protein, Low Carbohydrate, Low Fat, Calorie Counting, Fasting and Slim Fast; Weight Watchers and DASH; Wellbutrin, Dexatrim, Amphetamines and Prozac.  The most weight Nancy lost was 45 pounds on paleo but was only able to maintain that weight loss for 6 months.  Her maximum lifetime weight is 292 pounds.    As above, patient has been overweight for many years, with numerous failed dietary/weight loss attempts.  She now has obesity related comorbidities and as such has decided to pursue weight loss surgery.    Her cousin had LSG with us 3 years ago and feels great.  Patient is pursuing bariatric surgery to improve overall health.    History of hypertension.  Chronic shortness of breath and wheezing - patient suspects she may have asthma with episodes of bronchitis, has not had evaluation and not treated with inhalers.  Hypothyroidism currently controlled with medication.  Multiple episodes of kidney stones.  Psoriatic arthritis initially evaluated with rheumatologist thought to have lupus at one point but was then told she did not have it. Was dx with Sjogren's per labs but was negative on biopsy.  Now treated with Xeljanz and Arava and tramadol by PCP for PA.  History of blood transfusion with past .  History of migraines.    GI:  Has occasional acid reflux treated with Protonix as needed, not needed often.  Remote EGD with noted H. pylori " "and hiatal hernia. Past history of H. pylori treated in 2019 and retreated in .  s/p  cholecystectomy with gallstones.fatty liver.  Denies other GI symptoms.\"    --- No new issues/concerns.  Has lost 13 lbs.  H.Pylori UBT pending.      Cardiac Clearance 22 - low risk.   Pulmonary Clearance 22 - low risk.        Past Medical History:   Diagnosis Date   • Asthma 2021   • Bronchitis     seasonal   • Carpal tunnel syndrome    • COVID-19    • Disease of thyroid gland    • Dyspepsia    • Fatigue    • Fatty liver    • Fibromyalgia    • Gastroesophageal reflux disease without esophagitis     EGD remotely- hiatal hernia +  hpylori, takes protonix PRN   • Generalized headaches    • H. pylori infection     was treated  and retreated    • History of blood transfusion     with    • History of gallstones    • History of transfusion    • Hypertension    • Kidney stones    • Lupus (HCC)     denied, says was ruled out   • Migraine    • Psoriatic arthritis (HCC) 2021    on xeljanz and arava treated by APRN PCP, saw rheumatologist for dx   • Rectal bleed 2018   • S/P cholecystectomy     w/ gallstones   • Shortness of breath    • Wheezing      Past Surgical History:   Procedure Laterality Date   • BRAVO PROCEDURE N/A 2016    Procedure: ESOPHAGOGASTRODUODENOSCOPY AND LINDSEY;  Surgeon: Jorge Carmona MD;  Location:  [facility];  Service:    • BRAVO PROCEDURE N/A 2018    Procedure: ESOPHAGOGASTRODUODENOSCOPY AND LINDSEY;  Surgeon: Jorge Carmona MD;  Location:  [facility];  Service:    • CARPAL TUNNEL RELEASE     •  SECTION     •  SECTION     •  SECTION     • CHOLECYSTECTOMY      laparoscopic,  gallstones   • COLONOSCOPY N/A 2018    Procedure: COLONOSCOPY;  Surgeon: Jorge Carmona MD;  Location:  [facility];  Service:    • KIDNEY STONE SURGERY      lithotripsy   • WISDOM TOOTH EXTRACTION         Allergies   Allergen Reactions   • " "Penicillins Anaphylaxis   • Contrave [Naltrexone-Bupropion Hcl Er] Other (See Comments)     \"Felt horrible\"   • Keflex [Cephalexin] Rash   • Sulfa Antibiotics Rash   • Topamax [Topiramate] Other (See Comments)     Poor memory   • Venlafaxine Hcl Unknown - Low Severity     Cant remember reaction       Current Outpatient Medications:   •  albuterol sulfate  (90 Base) MCG/ACT inhaler, Inhale 2 puffs Every 4 (Four) Hours As Needed for Wheezing., Disp: , Rfl:   •  amLODIPine (NORVASC) 10 MG tablet, Take 1 tablet by mouth Daily., Disp: , Rfl:   •  carvedilol (COREG) 25 MG tablet, Take 1 tablet by mouth 2 (Two) Times a Day., Disp: 180 tablet, Rfl: 3  •  cetirizine (zyrTEC) 10 MG tablet, Take 10 mg by mouth Daily., Disp: , Rfl:   •  cloNIDine (CATAPRES) 0.1 MG tablet, Take 1 tablet by mouth 2 (Two) Times a Day., Disp: , Rfl:   •  furosemide (LASIX) 40 MG tablet, Take 1 tablet by mouth Daily., Disp: 30 tablet, Rfl: 11  •  leflunomide (ARAVA) 20 MG tablet, Take 1 tablet by mouth Daily., Disp: , Rfl:   •  meloxicam (MOBIC) 15 MG tablet, Take 1 tablet by mouth Daily., Disp: , Rfl:   •  pantoprazole (PROTONIX) 40 MG EC tablet, Take 40 mg by mouth Daily., Disp: , Rfl:   •  potassium chloride (K-DUR,KLOR-CON) 10 MEQ CR tablet, Take 1 tablet by mouth Every Other Day., Disp: 30 tablet, Rfl: 5  •  Tofacitinib Citrate ER (Xeljanz XR) 11 MG tablet sustained-release 24 hour, Take 11 mg by mouth Daily., Disp: , Rfl:   •  traMADol (ULTRAM) 50 MG tablet, Take 50 mg by mouth Every 6 (Six) Hours As Needed for Moderate Pain ., Disp: , Rfl:   •  DULoxetine (CYMBALTA) 60 MG capsule, Take 60 mg by mouth Daily., Disp: , Rfl:     Social History     Socioeconomic History   • Marital status:    Tobacco Use   • Smoking status: Never Smoker   • Smokeless tobacco: Never Used   Vaping Use   • Vaping Use: Never used   Substance and Sexual Activity   • Alcohol use: No   • Drug use: No   • Sexual activity: Defer     Social History     Social " History Narrative    Lives in [facility] with .  Works from home for [employer].        Family History   Problem Relation Age of Onset   • Hypertension Mother    • Heart disease Mother    • Hypertension Father    • Heart disease Father    • Cancer Daughter    • Diabetes Maternal Aunt    • Diabetes Maternal Grandmother    • No Known Problems Sister    • Hypertension Brother    • No Known Problems Brother    • No Known Problems Brother    • No Known Problems Brother          Physical Exam:  Vital Signs:  Weight: 122 kg (269 lb)   Body mass index is 46.17 kg/m².              From [facility]24[facility]:  Physical Exam  Vitals reviewed.   Constitutional:       Appearance: She is well-developed. She is obese.   HENT:      Head: Normocephalic.   Neck:      Thyroid: No thyromegaly.   Cardiovascular:      Rate and Rhythm: Normal rate and regular rhythm.      Heart sounds: Normal heart sounds.   Pulmonary:      Effort: Pulmonary effort is normal. No respiratory distress.      Breath sounds: Normal breath sounds. No wheezing.   Abdominal:      General: Bowel sounds are normal. There is no distension.      Palpations: Abdomen is soft.      Tenderness: There is no abdominal tenderness.      Comments: Lap scars   lower midline scar  Hyperpigmented scarred lesions at waistline skin fold   Musculoskeletal:         General: Normal range of motion.      Cervical back: Normal range of motion and neck supple.   Skin:     General: Skin is warm and dry.   Neurological:      Mental Status: She is alert and oriented to person, place, and time.   Psychiatric:         Mood and Affect: Mood normal.         Behavior: Behavior normal.         Thought Content: Thought content normal.         Judgment: Judgment normal.         Patient Active Problem List   Diagnosis   • Gastroesophageal reflux disease   • Hypertension   • Disease of thyroid gland   • Lupus (HCC)   • Precordial pain   • Palpitations   • PVC's (premature ventricular  contractions)   • HHD (hypertensive heart disease)   • Psoriatic arthritis (HCC)   • Sjogren's disease (HCC)   • Class 3 severe obesity in adult (HCC)   • Fatty liver   • Fibromyalgia   • Gastroesophageal reflux disease without esophagitis   • Generalized headaches   • H. pylori infection   • History of blood transfusion   • Migraine   • Reflux esophagitis   • S/P cholecystectomy   • Shortness of breath   • Wheezing   • Environmental and seasonal allergies   • Bronchitis, mucopurulent recurrent (HCC)   • Fatigue   • Dyspepsia       Assessment:  Nancy Oliver is a 51 y.o. female pursuing LSG w/ Dr. Malcolm.       ICD-10-CM ICD-9-CM   1. Gastroesophageal reflux disease, unspecified whether esophagitis present  K21.9 530.81       Metabolic Bariatric Surgery is deemed medically necessary given the following obesity related comorbidities including hypertension, cardiovascular disease, back pain, knee pain, fibromyalgia, GERD, and edema with current Weight: 122 kg (269 lb) and Body mass index is 46.17 kg/m².       Plan:  EGD @[facility] w/ Dr. Malcolm.  Additional input to follow.          JALEESA Holland    Note: This was an audio and video enabled telemedicine encounter conducted via [employer] during the COVID-19 pandemic.  Patient is located in [place].  Provider is at her office.  Consent was obtained prior to the visit.

## 2022-08-01 ENCOUNTER — OUTSIDE FACILITY SERVICE (OUTPATIENT)
Dept: BARIATRICS/WEIGHT MGMT | Facility: CLINIC | Age: 52
End: 2022-08-01

## 2022-08-01 ENCOUNTER — LAB REQUISITION (OUTPATIENT)
Dept: LAB | Facility: HOSPITAL | Age: 52
End: 2022-08-01

## 2022-08-01 DIAGNOSIS — K21.00 GASTRO-ESOPHAGEAL REFLUX DISEASE WITH ESOPHAGITIS, WITHOUT BLEEDING: ICD-10-CM

## 2022-08-01 PROCEDURE — 43239 EGD BIOPSY SINGLE/MULTIPLE: CPT | Performed by: SURGERY

## 2022-08-01 PROCEDURE — 88305 TISSUE EXAM BY PATHOLOGIST: CPT | Performed by: SURGERY

## 2022-08-03 LAB
CYTO UR: NORMAL
LAB AP CASE REPORT: NORMAL
LAB AP CLINICAL INFORMATION: NORMAL
PATH REPORT.FINAL DX SPEC: NORMAL
PATH REPORT.GROSS SPEC: NORMAL

## 2022-08-16 ENCOUNTER — TELEPHONE (OUTPATIENT)
Dept: BARIATRICS/WEIGHT MGMT | Facility: CLINIC | Age: 52
End: 2022-08-16

## 2022-08-16 DIAGNOSIS — R10.13 DYSPEPSIA: Primary | ICD-10-CM

## 2022-08-17 NOTE — TELEPHONE ENCOUNTER
Called pt to tell her that Dr. Malcolm ordered upper GI and that she would be called to schedule. Pt understood with no further questions or concerns.

## 2022-08-18 RX ORDER — FUROSEMIDE 40 MG/1
TABLET ORAL
Qty: 90 TABLET | Refills: 0 | Status: SHIPPED | OUTPATIENT
Start: 2022-08-18 | End: 2022-11-10 | Stop reason: HOSPADM

## 2022-08-18 RX ORDER — POTASSIUM CHLORIDE 750 MG/1
TABLET, EXTENDED RELEASE ORAL
Qty: 45 TABLET | Refills: 0 | Status: SHIPPED | OUTPATIENT
Start: 2022-08-18 | End: 2023-03-02

## 2022-08-18 RX ORDER — CARVEDILOL 25 MG/1
TABLET ORAL
Qty: 180 TABLET | Refills: 1 | Status: SHIPPED | OUTPATIENT
Start: 2022-08-18 | End: 2022-11-07

## 2022-09-15 ENCOUNTER — APPOINTMENT (OUTPATIENT)
Dept: GENERAL RADIOLOGY | Facility: HOSPITAL | Age: 52
End: 2022-09-15

## 2022-09-22 ENCOUNTER — LAB (OUTPATIENT)
Dept: LAB | Facility: HOSPITAL | Age: 52
End: 2022-09-22

## 2022-09-22 ENCOUNTER — HOSPITAL ENCOUNTER (OUTPATIENT)
Dept: GENERAL RADIOLOGY | Facility: HOSPITAL | Age: 52
Discharge: HOME | End: 2022-09-22

## 2022-09-22 DIAGNOSIS — R10.13 DYSPEPSIA: ICD-10-CM

## 2022-09-22 LAB
ALBUMIN SERPL-MCNC: 3.8 G/DL (ref 3.5–5.2)
ALBUMIN/GLOB SERPL: 1.4 G/DL
ALP SERPL-CCNC: 71 U/L (ref 39–117)
ALT SERPL W P-5'-P-CCNC: 18 U/L (ref 1–33)
ANION GAP SERPL CALCULATED.3IONS-SCNC: 8.1 MMOL/L (ref 5–15)
AST SERPL-CCNC: 18 U/L (ref 1–32)
BASOPHILS # BLD AUTO: 0.04 10*3/MM3 (ref 0–0.2)
BASOPHILS NFR BLD AUTO: 0.7 % (ref 0–1.5)
BILIRUB SERPL-MCNC: 0.3 MG/DL (ref 0–1.2)
BUN SERPL-MCNC: 14 MG/DL (ref 6–20)
BUN/CREAT SERPL: 13.1 (ref 7–25)
CALCIUM SPEC-SCNC: 9.9 MG/DL (ref 8.6–10.5)
CHLORIDE SERPL-SCNC: 105 MMOL/L (ref 98–107)
CHOLEST SERPL-MCNC: 200 MG/DL (ref 0–200)
CO2 SERPL-SCNC: 27.9 MMOL/L (ref 22–29)
CREAT SERPL-MCNC: 1.07 MG/DL (ref 0.57–1)
DEPRECATED RDW RBC AUTO: 41.2 FL (ref 37–54)
EGFRCR SERPLBLD CKD-EPI 2021: 63 ML/MIN/1.73
EOSINOPHIL # BLD AUTO: 0.35 10*3/MM3 (ref 0–0.4)
EOSINOPHIL NFR BLD AUTO: 6 % (ref 0.3–6.2)
ERYTHROCYTE [DISTWIDTH] IN BLOOD BY AUTOMATED COUNT: 13.4 % (ref 12.3–15.4)
GLOBULIN UR ELPH-MCNC: 2.8 GM/DL
GLUCOSE SERPL-MCNC: 91 MG/DL (ref 65–99)
HBA1C MFR BLD: 5.1 % (ref 4.8–5.6)
HCT VFR BLD AUTO: 40.5 % (ref 34–46.6)
HDLC SERPL-MCNC: 86 MG/DL (ref 40–60)
HGB BLD-MCNC: 13.2 G/DL (ref 12–15.9)
IMM GRANULOCYTES # BLD AUTO: 0.02 10*3/MM3 (ref 0–0.05)
IMM GRANULOCYTES NFR BLD AUTO: 0.3 % (ref 0–0.5)
LDLC SERPL CALC-MCNC: 99 MG/DL (ref 0–100)
LDLC/HDLC SERPL: 1.13 {RATIO}
LYMPHOCYTES # BLD AUTO: 1.95 10*3/MM3 (ref 0.7–3.1)
LYMPHOCYTES NFR BLD AUTO: 33.6 % (ref 19.6–45.3)
MCH RBC QN AUTO: 28.3 PG (ref 26.6–33)
MCHC RBC AUTO-ENTMCNC: 32.6 G/DL (ref 31.5–35.7)
MCV RBC AUTO: 86.7 FL (ref 79–97)
MONOCYTES # BLD AUTO: 0.43 10*3/MM3 (ref 0.1–0.9)
MONOCYTES NFR BLD AUTO: 7.4 % (ref 5–12)
NEUTROPHILS NFR BLD AUTO: 3.01 10*3/MM3 (ref 1.7–7)
NEUTROPHILS NFR BLD AUTO: 52 % (ref 42.7–76)
NRBC BLD AUTO-RTO: 0 /100 WBC (ref 0–0.2)
PLATELET # BLD AUTO: 270 10*3/MM3 (ref 140–450)
PMV BLD AUTO: 10.9 FL (ref 6–12)
POTASSIUM SERPL-SCNC: 4.4 MMOL/L (ref 3.5–5.2)
PROT SERPL-MCNC: 6.6 G/DL (ref 6–8.5)
RBC # BLD AUTO: 4.67 10*6/MM3 (ref 3.77–5.28)
SODIUM SERPL-SCNC: 141 MMOL/L (ref 136–145)
TRIGL SERPL-MCNC: 83 MG/DL (ref 0–150)
TSH SERPL DL<=0.05 MIU/L-ACNC: 3.87 UIU/ML (ref 0.27–4.2)
VLDLC SERPL-MCNC: 15 MG/DL (ref 5–40)
WBC NRBC COR # BLD: 5.8 10*3/MM3 (ref 3.4–10.8)

## 2022-09-22 PROCEDURE — 36415 COLL VENOUS BLD VENIPUNCTURE: CPT | Performed by: PHYSICIAN ASSISTANT

## 2022-09-22 PROCEDURE — 80061 LIPID PANEL: CPT | Performed by: PHYSICIAN ASSISTANT

## 2022-09-22 PROCEDURE — 74240 X-RAY XM UPR GI TRC 1CNTRST: CPT

## 2022-09-22 PROCEDURE — 80050 GENERAL HEALTH PANEL: CPT | Performed by: PHYSICIAN ASSISTANT

## 2022-09-22 PROCEDURE — 83036 HEMOGLOBIN GLYCOSYLATED A1C: CPT | Performed by: PHYSICIAN ASSISTANT

## 2022-10-14 DIAGNOSIS — R06.00 DYSPNEA, UNSPECIFIED TYPE: ICD-10-CM

## 2022-10-14 DIAGNOSIS — R53.83 FATIGUE, UNSPECIFIED TYPE: Primary | ICD-10-CM

## 2022-10-28 ENCOUNTER — PREP FOR SURGERY (OUTPATIENT)
Dept: OTHER | Facility: HOSPITAL | Age: 52
End: 2022-10-28

## 2022-10-28 ENCOUNTER — LAB (OUTPATIENT)
Dept: LAB | Facility: HOSPITAL | Age: 52
End: 2022-10-28

## 2022-10-28 ENCOUNTER — HOSPITAL ENCOUNTER (OUTPATIENT)
Dept: GENERAL RADIOLOGY | Facility: HOSPITAL | Age: 52
Discharge: HOME | End: 2022-10-28

## 2022-10-28 DIAGNOSIS — R53.83 FATIGUE, UNSPECIFIED TYPE: ICD-10-CM

## 2022-10-28 DIAGNOSIS — R06.00 DYSPNEA, UNSPECIFIED TYPE: ICD-10-CM

## 2022-10-28 DIAGNOSIS — E66.01 MORBID OBESITY WITH BODY MASS INDEX OF 45.0-49.9 IN ADULT: Primary | ICD-10-CM

## 2022-10-28 LAB
ALBUMIN SERPL-MCNC: 4.05 G/DL (ref 3.5–5.2)
ALBUMIN/GLOB SERPL: 1.4 G/DL
ALP SERPL-CCNC: 81 U/L (ref 39–117)
ALT SERPL W P-5'-P-CCNC: 17 U/L (ref 1–33)
ANION GAP SERPL CALCULATED.3IONS-SCNC: 12.4 MMOL/L (ref 5–15)
AST SERPL-CCNC: 15 U/L (ref 1–32)
BILIRUB SERPL-MCNC: <0.2 MG/DL (ref 0–1.2)
BUN SERPL-MCNC: 22 MG/DL (ref 6–20)
BUN/CREAT SERPL: 25.9 (ref 7–25)
CALCIUM SPEC-SCNC: 9.5 MG/DL (ref 8.6–10.5)
CHLORIDE SERPL-SCNC: 104 MMOL/L (ref 98–107)
CO2 SERPL-SCNC: 24.6 MMOL/L (ref 22–29)
CREAT SERPL-MCNC: 0.85 MG/DL (ref 0.57–1)
DEPRECATED RDW RBC AUTO: 43.8 FL (ref 37–54)
EGFRCR SERPLBLD CKD-EPI 2021: 83.1 ML/MIN/1.73
ERYTHROCYTE [DISTWIDTH] IN BLOOD BY AUTOMATED COUNT: 13.6 % (ref 12.3–15.4)
GLOBULIN UR ELPH-MCNC: 2.9 GM/DL
GLUCOSE SERPL-MCNC: 129 MG/DL (ref 65–99)
HCT VFR BLD AUTO: 42.6 % (ref 34–46.6)
HGB BLD-MCNC: 13.6 G/DL (ref 12–15.9)
MCH RBC QN AUTO: 27.9 PG (ref 26.6–33)
MCHC RBC AUTO-ENTMCNC: 31.9 G/DL (ref 31.5–35.7)
MCV RBC AUTO: 87.5 FL (ref 79–97)
PLATELET # BLD AUTO: 288 10*3/MM3 (ref 140–450)
PMV BLD AUTO: 11.7 FL (ref 6–12)
POTASSIUM SERPL-SCNC: 4 MMOL/L (ref 3.5–5.2)
PROT SERPL-MCNC: 6.9 G/DL (ref 6–8.5)
RBC # BLD AUTO: 4.87 10*6/MM3 (ref 3.77–5.28)
SODIUM SERPL-SCNC: 141 MMOL/L (ref 136–145)
WBC NRBC COR # BLD: 7.22 10*3/MM3 (ref 3.4–10.8)

## 2022-10-28 PROCEDURE — 36415 COLL VENOUS BLD VENIPUNCTURE: CPT

## 2022-10-28 PROCEDURE — 85027 COMPLETE CBC AUTOMATED: CPT

## 2022-10-28 PROCEDURE — 71046 X-RAY EXAM CHEST 2 VIEWS: CPT

## 2022-10-28 PROCEDURE — 80053 COMPREHEN METABOLIC PANEL: CPT

## 2022-10-28 PROCEDURE — 71046 X-RAY EXAM CHEST 2 VIEWS: CPT | Performed by: RADIOLOGY

## 2022-10-28 RX ORDER — SODIUM CHLORIDE, SODIUM LACTATE, POTASSIUM CHLORIDE, CALCIUM CHLORIDE 600; 310; 30; 20 MG/100ML; MG/100ML; MG/100ML; MG/100ML
150 INJECTION, SOLUTION INTRAVENOUS CONTINUOUS
Status: CANCELLED | OUTPATIENT
Start: 2022-10-28

## 2022-10-28 RX ORDER — GABAPENTIN 300 MG/1
600 CAPSULE ORAL ONCE
Status: CANCELLED | OUTPATIENT
Start: 2022-10-28 | End: 2022-10-28

## 2022-10-28 RX ORDER — SODIUM CHLORIDE 0.9 % (FLUSH) 0.9 %
3 SYRINGE (ML) INJECTION EVERY 12 HOURS SCHEDULED
Status: CANCELLED | OUTPATIENT
Start: 2022-10-28

## 2022-10-28 RX ORDER — SODIUM CHLORIDE 0.9 % (FLUSH) 0.9 %
3-10 SYRINGE (ML) INJECTION AS NEEDED
Status: CANCELLED | OUTPATIENT
Start: 2022-10-28

## 2022-10-28 RX ORDER — SCOLOPAMINE TRANSDERMAL SYSTEM 1 MG/1
1 PATCH, EXTENDED RELEASE TRANSDERMAL ONCE
Status: CANCELLED | OUTPATIENT
Start: 2022-10-28 | End: 2022-10-28

## 2022-10-28 RX ORDER — LEVOFLOXACIN 5 MG/ML
500 INJECTION, SOLUTION INTRAVENOUS EVERY 24 HOURS
Status: CANCELLED | OUTPATIENT
Start: 2022-10-28 | End: 2022-10-29

## 2022-10-28 RX ORDER — ACETAMINOPHEN 500 MG
1000 TABLET ORAL ONCE
Status: CANCELLED | OUTPATIENT
Start: 2022-10-28 | End: 2022-10-28

## 2022-10-28 RX ORDER — ENOXAPARIN SODIUM 100 MG/ML
40 INJECTION SUBCUTANEOUS ONCE
Status: CANCELLED | OUTPATIENT
Start: 2022-10-28 | End: 2022-10-28

## 2022-10-28 RX ORDER — CHLORHEXIDINE GLUCONATE 0.12 MG/ML
30 RINSE ORAL
Status: CANCELLED | OUTPATIENT
Start: 2022-10-28 | End: 2022-10-28

## 2022-10-28 RX ORDER — PANTOPRAZOLE SODIUM 40 MG/10ML
40 INJECTION, POWDER, LYOPHILIZED, FOR SOLUTION INTRAVENOUS ONCE
Status: CANCELLED | OUTPATIENT
Start: 2022-10-28 | End: 2022-10-28

## 2022-11-01 ENCOUNTER — CONSULT (OUTPATIENT)
Dept: BARIATRICS/WEIGHT MGMT | Facility: CLINIC | Age: 52
End: 2022-11-01

## 2022-11-01 VITALS
RESPIRATION RATE: 16 BRPM | WEIGHT: 264 LBS | HEART RATE: 100 BPM | TEMPERATURE: 97.7 F | DIASTOLIC BLOOD PRESSURE: 86 MMHG | SYSTOLIC BLOOD PRESSURE: 132 MMHG | HEIGHT: 64 IN | BODY MASS INDEX: 45.07 KG/M2 | OXYGEN SATURATION: 100 %

## 2022-11-01 DIAGNOSIS — K21.9 GASTROESOPHAGEAL REFLUX DISEASE WITHOUT ESOPHAGITIS: ICD-10-CM

## 2022-11-01 DIAGNOSIS — L40.50 PSORIATIC ARTHRITIS: Primary | ICD-10-CM

## 2022-11-01 DIAGNOSIS — I10 PRIMARY HYPERTENSION: ICD-10-CM

## 2022-11-01 DIAGNOSIS — R53.83 FATIGUE, UNSPECIFIED TYPE: ICD-10-CM

## 2022-11-01 DIAGNOSIS — E07.9 DISEASE OF THYROID GLAND: ICD-10-CM

## 2022-11-01 DIAGNOSIS — E66.01 OBESITY, CLASS III, BMI 40-49.9 (MORBID OBESITY): ICD-10-CM

## 2022-11-01 PROCEDURE — 99214 OFFICE O/P EST MOD 30 MIN: CPT | Performed by: SURGERY

## 2022-11-01 RX ORDER — VENLAFAXINE 75 MG/1
75 TABLET ORAL 2 TIMES DAILY
COMMUNITY
End: 2023-03-02

## 2022-11-01 NOTE — H&P (VIEW-ONLY)
[facility] GROUP BARIATRIC SURGERY  [address]  [address]  [facility] [id number] [phone]      Patient  Name:  Nancy Oliver  :  1970      Date of Visit: 22    Chief Complaint:  weight gain; unable to maintain weight loss.   Evaluate for possible metabolic and bariatric surgery    History of Present Illness:  Nancy Oliver is a 52 y.o. female who presents today for evaluation, education and consultation regarding metabolic and bariatric surgery (MBS).  Since last seen 2022 she has lost 5 pounds.  The patient returns for final visit prior to metabolic and bariatric surgery specifically the sleeve gastrectomy.  Original intake evaluation Shobha Chan PA-C dated 2022 reviewed.    She notes the patient's maximum lifetime weight is 292 pounds and that her cousin had sleeve gastrectomy with us 3 years ago and feels great and that she has history of hypertension chronic shortness of breath and wheezing and suspect she may have asthma with episodes of bronchitis has not had evaluation and not treated with inhalers hypothyroidism currently controlled with medication (see below the patient has not been taking thyroid medication for over a year) multiple episodes of kidney stones, psoriatic arthritis initially evaluated with rheumatologist thought to have lupus at one-point and then was diagnosed with Sjogren's syndrome per labs but negative a biopsy treated with Xeljanz and Arava and tramadol by PCP for psoriatic arthritis history of blood transfusion with past .  History of migraines.  And that she has occasional acid reflux treated with Protonix as needed not needed often remote EGD with H. pylori and hiatal hernia H. pylori treated in 2019 and retreated in 2020 status postcholecystectomy for gallstones and fatty liver.      The patient has had issues with morbid obesity for years and only temporary success with non-surgical methods of weight loss.  The  patient is seeking LSG to help with the morbid obesity related conditions of asthma, psoriatic arthritis, history of blood transfusion, seasonal bronchitis, carpal tunnel syndrome, previous hypothyroidism, fatigue, fatty liver, fibromyalgia, generalized headaches/migraines, GE reflux disease, history of H. pylori gastritis, hypertension, kidney stones, dyspnea on exertion.    52-year-old morbidly obese female from [place].  She says since informed consent she has already lost 5 pounds and feels so much better since quitting high fructose corn syrup.  She had never heard to avoid that before.  She is raising two 3-year-old grandchildren (1 her daughter's, 1 her son's) and the grandson loves sugar products especially little Paulina's and she is going to try and find similar products that contain sugar.  She has had nausea the last 3 days and wonders if it is because she gave up sugar in the preop diet as she is scheduled for [facility] in 8 days.  Her mother had a DVT after knee surgery.  The patient personally has not had a DVT with any of her 3 C-sections, cholecystectomy, etc.  Her pregnancy is overall uneventful without preeclampsia, gestational diabetes or wound problems but did did receive 2 units of packed red blood cells for bleeding complications with her first .  She has been off her PPI now for a while without symptoms, she thinks her reflux is worse with sugars.  We did discuss that her EGD with me and recent upper GI do not show a hiatal hernia and she is comfortable with no repair if 1 is not present at the time of surgery.  On her own she quit taking her Coreg as recommended by her cardiologist saying she feels she does not need it.  On her own she quit taking her Arava, tramadol, meloxicam, Protonix and Xeljanz a couple months ago in anticipation of surgery.  She is starting to get some psoriatic lesions recurring.  She is aware to avoid NSAIDs 1 week prior in 6 weeks after  "sleeve gastrectomy and to stay off her Xeljanz and Arava until surgery and for 4 weeks afterwards to minimize the risk of delayed leak.  She says she has not required her Lasix for quite some time and takes it as needed only for swelling.  She takes her clonidine for hot flashes.  Her tachycardia in the office today is asymptomatic.  She says she has not taken her thyroid medication in over a year \"I just forgot\" and her levels have been normal, previously told she had hypothyroidism.      Past Medical History:   Diagnosis Date   • Asthma 2021   • Bronchitis     seasonal   • Carpal tunnel syndrome    • COVID-19    • Disease of thyroid gland    • Dyspepsia    • Fatigue    • Fatty liver    • Fibromyalgia    • Generalized headaches    • GERD     EGD Dr. Malcolm 22, no HH. UGI 22 unrmk.  Remote EGD Dr. Carmona - hiatal hernia +  hpylori, takes protonix PRN   • H. pylori infection     was treated  and retreated    • History of blood transfusion     with    • History of gallstones    • History of transfusion    • Hypertension    • Kidney stones    • Lupus (HCC)     denied, says was ruled out   • Migraine    • Psoriatic arthritis (HCC) 2021    on xeljanz and arava treated by APRN PCP, saw rheumatologist for dx   • Rectal bleed 2018   • S/P cholecystectomy     w/ gallstones   • Shortness of breath    • Wheezing      Past Surgical History:   Procedure Laterality Date   • BRAVO PROCEDURE N/A 2016    Procedure: ESOPHAGOGASTRODUODENOSCOPY AND LINDSEY;  Surgeon: Jorge Carmona MD;  Location: [facility] OR;  Service:    • BRAVO PROCEDURE N/A 2018    Procedure: ESOPHAGOGASTRODUODENOSCOPY AND LINDSEY;  Surgeon: Jorge Carmona MD;  Location: [facility] OR;  Service:    • CARPAL TUNNEL RELEASE     •  SECTION     •  SECTION     •  SECTION     • CHOLECYSTECTOMY      laparoscopic,  gallstones   • COLONOSCOPY N/A 2018    Procedure: COLONOSCOPY; " " Surgeon: Jorge Carmona MD;  Location: [facility] OR;  Service:    • KIDNEY STONE SURGERY      lithotripsy   • WISDOM TOOTH EXTRACTION         Allergies   Allergen Reactions   • Penicillins Anaphylaxis   • Contrave [Naltrexone-Bupropion Hcl Er] Other (See Comments)     \"Felt horrible\"   • Keflex [Cephalexin] Rash   • Sulfa Antibiotics Rash   • Topamax [Topiramate] Other (See Comments)     Poor memory   • Venlafaxine Hcl Unknown - Low Severity     Cant remember reaction       Current Outpatient Medications:   •  amLODIPine (NORVASC) 10 MG tablet, Take 1 tablet by mouth Daily., Disp: , Rfl:   •  cetirizine (zyrTEC) 10 MG tablet, Take 10 mg by mouth Daily., Disp: , Rfl:   •  cloNIDine (CATAPRES) 0.1 MG tablet, Take 1 tablet by mouth 2 (Two) Times a Day., Disp: , Rfl:   •  albuterol sulfate  (90 Base) MCG/ACT inhaler, Inhale 2 puffs Every 4 (Four) Hours As Needed for Wheezing., Disp: , Rfl:   •  carvedilol (COREG) 25 MG tablet, TAKE 1 TABLET TWO TIMES A DAY FOR BLOOD PRESSURE OR FOR HEART (MAY CAUSE DROWSINESS), Disp: 180 tablet, Rfl: 1  •  DULoxetine (CYMBALTA) 60 MG capsule, Take 60 mg by mouth Daily., Disp: , Rfl:   •  furosemide (LASIX) 40 MG tablet, TAKE 1 TABLET EVERY MORNING FOR FLUID AND BLOOD PRESSURE, Disp: 90 tablet, Rfl: 0  •  leflunomide (ARAVA) 20 MG tablet, Take 1 tablet by mouth Daily., Disp: , Rfl:   •  meloxicam (MOBIC) 15 MG tablet, Take 1 tablet by mouth Daily., Disp: , Rfl:   •  pantoprazole (PROTONIX) 40 MG EC tablet, Take 40 mg by mouth Daily., Disp: , Rfl:   •  potassium chloride (K-DUR,KLOR-CON) 10 MEQ CR tablet, TAKE 1 TABLET EVERY OTHER DAY WITH FOOD FOR POTASSIUM, Disp: 45 tablet, Rfl: 0  •  Tofacitinib Citrate ER (Xeljanz XR) 11 MG tablet sustained-release 24 hour, Take 11 mg by mouth Daily., Disp: , Rfl:   •  traMADol (ULTRAM) 50 MG tablet, Take 50 mg by mouth Every 6 (Six) Hours As Needed for Moderate Pain ., Disp: , Rfl:   •  venlafaxine (EFFEXOR) 75 MG tablet, Take 1 tablet by mouth " 2 (Two) Times a Day., Disp: , Rfl:     Social History     Socioeconomic History   • Marital status:    Tobacco Use   • Smoking status: Never   • Smokeless tobacco: Never   Vaping Use   • Vaping Use: Never used   Substance and Sexual Activity   • Alcohol use: No   • Drug use: No   • Sexual activity: Defer     Family History   Problem Relation Age of Onset   • Hypertension Mother    • Heart disease Mother    • Hypertension Father    • Heart disease Father    • Cancer Daughter    • Diabetes Maternal Aunt    • Diabetes Maternal Grandmother    • No Known Problems Sister    • Hypertension Brother    • No Known Problems Brother    • No Known Problems Brother    • No Known Problems Brother        Review of Systems   Constitutional: Positive for fatigue and unexpected weight gain. Negative for chills, diaphoresis, fever and unexpected weight loss.   HENT: Negative for congestion and facial swelling.    Eyes: Negative for blurred vision, double vision and discharge.   Respiratory: Negative for chest tightness, shortness of breath and stridor.    Cardiovascular: Negative for chest pain, palpitations and leg swelling.   Gastrointestinal: Positive for GERD. Negative for blood in stool.   Endocrine: Negative for polydipsia.   Genitourinary: Negative for hematuria.   Musculoskeletal: Positive for myalgias.   Skin: Negative for color change.   Allergic/Immunologic: Negative for immunocompromised state.   Neurological: Negative for confusion.   Psychiatric/Behavioral: Negative for self-injury.       I have reviewed the ROS and confirm that it's accurate today.    Physical Exam:  Vital Signs:  Weight: 120 kg (264 lb)   Body mass index is 45.32 kg/m².  Temp: 97.7 °F (36.5 °C)   Heart Rate: 100   BP: 132/86     Physical Exam  Vitals reviewed.   Constitutional:       Appearance: She is well-developed.   HENT:      Head: Normocephalic and atraumatic.      Nose:      Comments: mask  Eyes:      Conjunctiva/sclera: Conjunctivae  normal.      Pupils: Pupils are equal, round, and reactive to light.   Neck:      Thyroid: No thyromegaly.      Vascular: No carotid bruit.      Trachea: No tracheal deviation.   Cardiovascular:      Rate and Rhythm: Regular rhythm. Tachycardia present.      Heart sounds: Normal heart sounds.   Pulmonary:      Effort: Pulmonary effort is normal. No respiratory distress.      Breath sounds: Normal breath sounds.   Abdominal:      General: There is no distension.      Palpations: Abdomen is soft.      Tenderness: There is no abdominal tenderness.      Comments: Lap scars, lower midline scar   Musculoskeletal:         General: No deformity. Normal range of motion.      Cervical back: Normal range of motion and neck supple.   Skin:     General: Skin is warm and dry.      Findings: No rash.   Neurological:      Mental Status: She is alert and oriented to person, place, and time.      Cranial Nerves: No cranial nerve deficit.      Coordination: Coordination normal.   Psychiatric:         Behavior: Behavior normal.         Thought Content: Thought content normal.         Judgment: Judgment normal.         Patient Active Problem List   Diagnosis   • Gastroesophageal reflux disease   • Hypertension   • Disease of thyroid gland   • Lupus ([facility])   • Precordial pain   • Palpitations   • PVC's (premature ventricular contractions)   • HHD (hypertensive heart disease)   • Psoriatic arthritis ([facility])   • Sjogren's disease ([facility])   • Class 3 severe obesity in adult ([facility])   • Fatty liver   • Fibromyalgia   • Gastroesophageal reflux disease without esophagitis   • Generalized headaches   • H. pylori infection   • History of blood transfusion   • Migraine   • Reflux esophagitis   • S/P cholecystectomy   • Shortness of breath   • Wheezing   • Environmental and seasonal allergies   • Bronchitis, mucopurulent recurrent ([facility])   • Fatigue   • Dyspepsia   • Morbid obesity with body mass index (BMI) of 45.0 to 49.9 in adult ([facility])        Assessment:    Nancy Oliver is a 52 y.o. year old female with medically complicated obesity.    Metabolic and bariatric surgery is deemed medically necessary given the following obesity related comorbidities including asthma, psoriatic arthritis, history of blood transfusion, seasonal bronchitis, carpal tunnel syndrome, previous hypothyroidism, fatigue, fatty liver, fibromyalgia, generalized headaches/migraines, GE reflux disease, history of H. pylori gastritis, hypertension, kidney stones, dyspnea on exertion with current Weight: 120 kg (264 lb) and Body mass index is 45.32 kg/m²..    Patient is aware that surgery is a tool, and that weight loss and improvement in comorbidities is not guaranteed but only seen in the context of appropriate use, follow up and physical activity.    The patient was present for an approximately a 2.5 hour discussion of the purpose of MBS, how MBS is a tool to assist in achieving weight loss goals, the most common complications and how best to avoid them, and the strategies for short and long term weight loss and improvement in comorbidities.  Ample opportunity to discuss questions was available both in group and during the time of individual examination.    I reviewed her Ian report showing multiple phentermine, tramadol x1.  Labs dated 10/28/2022 normal CMP except for glucose of 129 BUN of 22 normal CBC without a differential of note hemoglobin 13.6 chest x-ray dated 10/28/2022 no active process upper GI dated 9/22/2022 unremarkable EGD Dr. Malcolm dated 8/1/2022 no definite hiatal hernia appreciated Z-line 35 cm mild diffuse gastritis bilious secretions throughout the stomach I noted I did her cousin sleeve gastrectomy 3 years ago that the patient has a history of reflux for which she takes Protonix as needed usually twice a week no dysphagia had an EGD with Dr. Carmona a few years ago showing a hiatal hernia and H. pylori gastritis and she took treatment and thinks things  did improve but it keeps coming back she says her H. pylori symptoms presented as acute onset of epigastric pain and that she had a negative sleep study a few years ago and is in the process of repeating this no tobacco or secondhand smoke.  Antrum biopsy showed minimal chronic inactive inflammation negative for H. pylori fundus biopsies showed no significant pathologic abnormalities negative for H. pylori distal esophageal biopsies showed mild reactive changes otherwise unremarkable.  Cardiology clearance dated 5/26/2022 low risk Dr. Gena Dow ``.  P pulmonary function tests dated 6/16/2022 FVC 84 FEV1 88 DLCO 70.  Psychosocial evaluation dated 5/24/2022 Linda Bojorquez, PhD good candidate.  Dietitian evaluation dated 5/24/2022 Abigail bermeo RD noting the patient drinks 312 ounce cans of either diet Pepsi or diet Mountain Dew daily and that her diet is low in protein high in processed carbohydrate and lacking in fruits and vegetables.  Negative H. pylori breath test dated 9/20/2022. Labs dated 5/24/2022 normal lipid panel except for HDL of 86 normal hemoglobin A1c, normal TSH letter from [facility] OLIVA undated saying the patient will temporarily discontinue immunoodulators for bariatric surgery.  Pulmonary clearance dated 6/16/2022 HAZEL Gooden noting normal PFTs and clearing her at low risk with a 15 total criteria point count.  Echocardiogram dated 10/22/2021 ejection fraction roughly 55% normal systolic function mild aortic valve regurgitation trace mitral valve regurgitation exercise stress test dated 10/22/2021 no interpretation.  PET stress test July 2021 unremarkable.  Please see scanned records that I have reviewed and signed off on today.  All of this in addition to the patient's unique history and exam has been taken into consideration in determining their appropriate candidacy for MBS.    Complications  of laparoscopic/possible robotic gastric sleeve were discussed. The patient is well aware of the  "potential complications of surgery that include but not limited to bleeding, infections, deep venous thrombosis, pulmonary embolism, pulmonary complications such as pneumonia, cardiac events, hernias, small bowel obstruction, damage to the spleen or other organs, bowel injury, disfiguring scars, failure to lose weight, need for additional surgery, conversion to an open procedure, and death. Patient is also aware of complications which apply in this particular procedure that can include but are not limited to a \"leak\" at the staple line which in some instances may require conversion to gastric bypass.    The patient is aware if a hiatal hernia is encountered, it likely will be repaired.  R/B/A Rx to hiatal hernia repair were discussed as outlined in our long consent form.  Briefly risks in addition to those for LSG include recurrent hernia, IVANA, dysphagia, esophageal injury, pneumothorax, injury to the vagus nerves, injury to the thoracic duct, aorta or vena cava.    I discussed avoiding all tobacco products, nicotine,  and second hand smoke at least 2 weeks pre-operatively and 6 weeks post-operatively to minimize the risk of sleeve leak.  This included discussing the importance of avoiding even secondhand smoke as the risk of leak is increased.  Examples discussed:  Avoid going in a house or riding in a car where someone has previously smoked in the last 2 weeks and for 6 weeks postoperatively.  Avoid living in a house where someone smokes (even if it's in a separate room/patio/attached garage, etc.).   Avoid congregating with a group of people who are smoking even if it's outside.  It is OK to be around wood burning fires and barbecue.  I explained that I do not know if marijuana has a same effects but my overall recommendation is to avoid it for 2 weeks prior in 6 weeks after surgery.     Discussed the risks, benefits and alternative therapies at great length as outlined in our extensive consent forms, consent " videos, and educational teaching process under the direction of the center's .    A copy of the patient's signed informed consent is on file.    R/B/A Rx discussed to postop anticoagulation incl but not limited to bleeding, drug reaction, venothromboembolic events, etc. and the patient declined.        Plan:    After evaluation I think the patient is a reasonable candidate for laparoscopic sleeve gastrectomy and EGD.  [place].  Other issues include asthma, psoriatic arthritis, history of blood transfusion, seasonal bronchitis, carpal tunnel syndrome, previous hypothyroidism, fatigue, fatty liver, fibromyalgia, generalized headaches/migraines, GE reflux disease, history of H. pylori gastritis, hypertension, kidney stones, dyspnea on exertion.    Thank you HAZEL Hurtado for the opportunity evaluate Mrs. Oliver.      Nolan Malcolm MD

## 2022-11-01 NOTE — PROGRESS NOTES
[facility] GROUP BARIATRIC SURGERY  [address]  [address]  [facility] [id number] [phone]      Patient  Name:  Nancy Oliver  :  1970      Date of Visit: 22    Chief Complaint:  weight gain; unable to maintain weight loss.   Evaluate for possible metabolic and bariatric surgery    History of Present Illness:  Nancy Oliver is a 52 y.o. female who presents today for evaluation, education and consultation regarding metabolic and bariatric surgery (MBS).  Since last seen 2022 she has lost 5 pounds.  The patient returns for final visit prior to metabolic and bariatric surgery specifically the sleeve gastrectomy.  Original intake evaluation Shobha Chan PA-C dated 2022 reviewed.    She notes the patient's maximum lifetime weight is 292 pounds and that her cousin had sleeve gastrectomy with us 3 years ago and feels great and that she has history of hypertension chronic shortness of breath and wheezing and suspect she may have asthma with episodes of bronchitis has not had evaluation and not treated with inhalers hypothyroidism currently controlled with medication (see below the patient has not been taking thyroid medication for over a year) multiple episodes of kidney stones, psoriatic arthritis initially evaluated with rheumatologist thought to have lupus at one-point and then was diagnosed with Sjogren's syndrome per labs but negative a biopsy treated with Xeljanz and Arava and tramadol by PCP for psoriatic arthritis history of blood transfusion with past .  History of migraines.  And that she has occasional acid reflux treated with Protonix as needed not needed often remote EGD with H. pylori and hiatal hernia H. pylori treated in 2019 and retreated in 2020 status postcholecystectomy for gallstones and fatty liver.      The patient has had issues with morbid obesity for years and only temporary success with non-surgical methods of weight loss.  The  patient is seeking LSG to help with the morbid obesity related conditions of asthma, psoriatic arthritis, history of blood transfusion, seasonal bronchitis, carpal tunnel syndrome, previous hypothyroidism, fatigue, fatty liver, fibromyalgia, generalized headaches/migraines, GE reflux disease, history of H. pylori gastritis, hypertension, kidney stones, dyspnea on exertion.    52-year-old morbidly obese female from [place].  She says since informed consent she has already lost 5 pounds and feels so much better since quitting high fructose corn syrup.  She had never heard to avoid that before.  She is raising two 3-year-old grandchildren (1 her daughter's, 1 her son's) and the grandson loves sugar products especially little Paulina's and she is going to try and find similar products that contain sugar.  She has had nausea the last 3 days and wonders if it is because she gave up sugar in the preop diet as she is scheduled for [facility] in 8 days.  Her mother had a DVT after knee surgery.  The patient personally has not had a DVT with any of her 3 C-sections, cholecystectomy, etc.  Her pregnancy is overall uneventful without preeclampsia, gestational diabetes or wound problems but did did receive 2 units of packed red blood cells for bleeding complications with her first .  She has been off her PPI now for a while without symptoms, she thinks her reflux is worse with sugars.  We did discuss that her EGD with me and recent upper GI do not show a hiatal hernia and she is comfortable with no repair if 1 is not present at the time of surgery.  On her own she quit taking her Coreg as recommended by her cardiologist saying she feels she does not need it.  On her own she quit taking her Arava, tramadol, meloxicam, Protonix and Xeljanz a couple months ago in anticipation of surgery.  She is starting to get some psoriatic lesions recurring.  She is aware to avoid NSAIDs 1 week prior in 6 weeks after  "sleeve gastrectomy and to stay off her Xeljanz and Arava until surgery and for 4 weeks afterwards to minimize the risk of delayed leak.  She says she has not required her Lasix for quite some time and takes it as needed only for swelling.  She takes her clonidine for hot flashes.  Her tachycardia in the office today is asymptomatic.  She says she has not taken her thyroid medication in over a year \"I just forgot\" and her levels have been normal, previously told she had hypothyroidism.      Past Medical History:   Diagnosis Date   • Asthma 2021   • Bronchitis     seasonal   • Carpal tunnel syndrome    • COVID-19    • Disease of thyroid gland    • Dyspepsia    • Fatigue    • Fatty liver    • Fibromyalgia    • Generalized headaches    • GERD     EGD Dr. Malcolm 22, no HH. UGI 22 unrmk.  Remote EGD Dr. Carmona - hiatal hernia +  hpylori, takes protonix PRN   • H. pylori infection     was treated  and retreated    • History of blood transfusion     with    • History of gallstones    • History of transfusion    • Hypertension    • Kidney stones    • Lupus (HCC)     denied, says was ruled out   • Migraine    • Psoriatic arthritis (HCC) 2021    on xeljanz and arava treated by APRN PCP, saw rheumatologist for dx   • Rectal bleed 2018   • S/P cholecystectomy     w/ gallstones   • Shortness of breath    • Wheezing      Past Surgical History:   Procedure Laterality Date   • BRAVO PROCEDURE N/A 2016    Procedure: ESOPHAGOGASTRODUODENOSCOPY AND LINDSEY;  Surgeon: Jorge Carmona MD;  Location: [facility] OR;  Service:    • BRAVO PROCEDURE N/A 2018    Procedure: ESOPHAGOGASTRODUODENOSCOPY AND LINDSEY;  Surgeon: Jorge Carmona MD;  Location: [facility] OR;  Service:    • CARPAL TUNNEL RELEASE     •  SECTION     •  SECTION     •  SECTION     • CHOLECYSTECTOMY      laparoscopic,  gallstones   • COLONOSCOPY N/A 2018    Procedure: COLONOSCOPY; " " Surgeon: Jorge Carmona MD;  Location: [facility] OR;  Service:    • KIDNEY STONE SURGERY      lithotripsy   • WISDOM TOOTH EXTRACTION         Allergies   Allergen Reactions   • Penicillins Anaphylaxis   • Contrave [Naltrexone-Bupropion Hcl Er] Other (See Comments)     \"Felt horrible\"   • Keflex [Cephalexin] Rash   • Sulfa Antibiotics Rash   • Topamax [Topiramate] Other (See Comments)     Poor memory   • Venlafaxine Hcl Unknown - Low Severity     Cant remember reaction       Current Outpatient Medications:   •  amLODIPine (NORVASC) 10 MG tablet, Take 1 tablet by mouth Daily., Disp: , Rfl:   •  cetirizine (zyrTEC) 10 MG tablet, Take 10 mg by mouth Daily., Disp: , Rfl:   •  cloNIDine (CATAPRES) 0.1 MG tablet, Take 1 tablet by mouth 2 (Two) Times a Day., Disp: , Rfl:   •  albuterol sulfate  (90 Base) MCG/ACT inhaler, Inhale 2 puffs Every 4 (Four) Hours As Needed for Wheezing., Disp: , Rfl:   •  carvedilol (COREG) 25 MG tablet, TAKE 1 TABLET TWO TIMES A DAY FOR BLOOD PRESSURE OR FOR HEART (MAY CAUSE DROWSINESS), Disp: 180 tablet, Rfl: 1  •  DULoxetine (CYMBALTA) 60 MG capsule, Take 60 mg by mouth Daily., Disp: , Rfl:   •  furosemide (LASIX) 40 MG tablet, TAKE 1 TABLET EVERY MORNING FOR FLUID AND BLOOD PRESSURE, Disp: 90 tablet, Rfl: 0  •  leflunomide (ARAVA) 20 MG tablet, Take 1 tablet by mouth Daily., Disp: , Rfl:   •  meloxicam (MOBIC) 15 MG tablet, Take 1 tablet by mouth Daily., Disp: , Rfl:   •  pantoprazole (PROTONIX) 40 MG EC tablet, Take 40 mg by mouth Daily., Disp: , Rfl:   •  potassium chloride (K-DUR,KLOR-CON) 10 MEQ CR tablet, TAKE 1 TABLET EVERY OTHER DAY WITH FOOD FOR POTASSIUM, Disp: 45 tablet, Rfl: 0  •  Tofacitinib Citrate ER (Xeljanz XR) 11 MG tablet sustained-release 24 hour, Take 11 mg by mouth Daily., Disp: , Rfl:   •  traMADol (ULTRAM) 50 MG tablet, Take 50 mg by mouth Every 6 (Six) Hours As Needed for Moderate Pain ., Disp: , Rfl:   •  venlafaxine (EFFEXOR) 75 MG tablet, Take 1 tablet by mouth " 2 (Two) Times a Day., Disp: , Rfl:     Social History     Socioeconomic History   • Marital status:    Tobacco Use   • Smoking status: Never   • Smokeless tobacco: Never   Vaping Use   • Vaping Use: Never used   Substance and Sexual Activity   • Alcohol use: No   • Drug use: No   • Sexual activity: Defer     Family History   Problem Relation Age of Onset   • Hypertension Mother    • Heart disease Mother    • Hypertension Father    • Heart disease Father    • Cancer Daughter    • Diabetes Maternal Aunt    • Diabetes Maternal Grandmother    • No Known Problems Sister    • Hypertension Brother    • No Known Problems Brother    • No Known Problems Brother    • No Known Problems Brother        Review of Systems   Constitutional: Positive for fatigue and unexpected weight gain. Negative for chills, diaphoresis, fever and unexpected weight loss.   HENT: Negative for congestion and facial swelling.    Eyes: Negative for blurred vision, double vision and discharge.   Respiratory: Negative for chest tightness, shortness of breath and stridor.    Cardiovascular: Negative for chest pain, palpitations and leg swelling.   Gastrointestinal: Positive for GERD. Negative for blood in stool.   Endocrine: Negative for polydipsia.   Genitourinary: Negative for hematuria.   Musculoskeletal: Positive for myalgias.   Skin: Negative for color change.   Allergic/Immunologic: Negative for immunocompromised state.   Neurological: Negative for confusion.   Psychiatric/Behavioral: Negative for self-injury.       I have reviewed the ROS and confirm that it's accurate today.    Physical Exam:  Vital Signs:  Weight: 120 kg (264 lb)   Body mass index is 45.32 kg/m².  Temp: 97.7 °F (36.5 °C)   Heart Rate: 100   BP: 132/86     Physical Exam  Vitals reviewed.   Constitutional:       Appearance: She is well-developed.   HENT:      Head: Normocephalic and atraumatic.      Nose:      Comments: mask  Eyes:      Conjunctiva/sclera: Conjunctivae  normal.      Pupils: Pupils are equal, round, and reactive to light.   Neck:      Thyroid: No thyromegaly.      Vascular: No carotid bruit.      Trachea: No tracheal deviation.   Cardiovascular:      Rate and Rhythm: Regular rhythm. Tachycardia present.      Heart sounds: Normal heart sounds.   Pulmonary:      Effort: Pulmonary effort is normal. No respiratory distress.      Breath sounds: Normal breath sounds.   Abdominal:      General: There is no distension.      Palpations: Abdomen is soft.      Tenderness: There is no abdominal tenderness.      Comments: Lap scars, lower midline scar   Musculoskeletal:         General: No deformity. Normal range of motion.      Cervical back: Normal range of motion and neck supple.   Skin:     General: Skin is warm and dry.      Findings: No rash.   Neurological:      Mental Status: She is alert and oriented to person, place, and time.      Cranial Nerves: No cranial nerve deficit.      Coordination: Coordination normal.   Psychiatric:         Behavior: Behavior normal.         Thought Content: Thought content normal.         Judgment: Judgment normal.         Patient Active Problem List   Diagnosis   • Gastroesophageal reflux disease   • Hypertension   • Disease of thyroid gland   • Lupus ([facility])   • Precordial pain   • Palpitations   • PVC's (premature ventricular contractions)   • HHD (hypertensive heart disease)   • Psoriatic arthritis ([facility])   • Sjogren's disease ([facility])   • Class 3 severe obesity in adult ([facility])   • Fatty liver   • Fibromyalgia   • Gastroesophageal reflux disease without esophagitis   • Generalized headaches   • H. pylori infection   • History of blood transfusion   • Migraine   • Reflux esophagitis   • S/P cholecystectomy   • Shortness of breath   • Wheezing   • Environmental and seasonal allergies   • Bronchitis, mucopurulent recurrent ([facility])   • Fatigue   • Dyspepsia   • Morbid obesity with body mass index (BMI) of 45.0 to 49.9 in adult ([facility])        Assessment:    Nancy Oliver is a 52 y.o. year old female with medically complicated obesity.    Metabolic and bariatric surgery is deemed medically necessary given the following obesity related comorbidities including asthma, psoriatic arthritis, history of blood transfusion, seasonal bronchitis, carpal tunnel syndrome, previous hypothyroidism, fatigue, fatty liver, fibromyalgia, generalized headaches/migraines, GE reflux disease, history of H. pylori gastritis, hypertension, kidney stones, dyspnea on exertion with current Weight: 120 kg (264 lb) and Body mass index is 45.32 kg/m²..    Patient is aware that surgery is a tool, and that weight loss and improvement in comorbidities is not guaranteed but only seen in the context of appropriate use, follow up and physical activity.    The patient was present for an approximately a 2.5 hour discussion of the purpose of MBS, how MBS is a tool to assist in achieving weight loss goals, the most common complications and how best to avoid them, and the strategies for short and long term weight loss and improvement in comorbidities.  Ample opportunity to discuss questions was available both in group and during the time of individual examination.    I reviewed her Ian report showing multiple phentermine, tramadol x1.  Labs dated 10/28/2022 normal CMP except for glucose of 129 BUN of 22 normal CBC without a differential of note hemoglobin 13.6 chest x-ray dated 10/28/2022 no active process upper GI dated 9/22/2022 unremarkable EGD Dr. Malcolm dated 8/1/2022 no definite hiatal hernia appreciated Z-line 35 cm mild diffuse gastritis bilious secretions throughout the stomach I noted I did her cousin sleeve gastrectomy 3 years ago that the patient has a history of reflux for which she takes Protonix as needed usually twice a week no dysphagia had an EGD with Dr. Carmona a few years ago showing a hiatal hernia and H. pylori gastritis and she took treatment and thinks things  did improve but it keeps coming back she says her H. pylori symptoms presented as acute onset of epigastric pain and that she had a negative sleep study a few years ago and is in the process of repeating this no tobacco or secondhand smoke.  Antrum biopsy showed minimal chronic inactive inflammation negative for H. pylori fundus biopsies showed no significant pathologic abnormalities negative for H. pylori distal esophageal biopsies showed mild reactive changes otherwise unremarkable.  Cardiology clearance dated 5/26/2022 low risk Dr. Gena Dow ``.  P pulmonary function tests dated 6/16/2022 FVC 84 FEV1 88 DLCO 70.  Psychosocial evaluation dated 5/24/2022 Linda Bojorquez, PhD good candidate.  Dietitian evaluation dated 5/24/2022 Abigail bermeo RD noting the patient drinks 312 ounce cans of either diet Pepsi or diet Mountain Dew daily and that her diet is low in protein high in processed carbohydrate and lacking in fruits and vegetables.  Negative H. pylori breath test dated 9/20/2022. Labs dated 5/24/2022 normal lipid panel except for HDL of 86 normal hemoglobin A1c, normal TSH letter from R[facility] OLIVA undated saying the patient will temporarily discontinue immunoodulators for bariatric surgery.  Pulmonary clearance dated 6/16/2022 HAZEL Gooden noting normal PFTs and clearing her at low risk with a 15 total criteria point count.  Echocardiogram dated 10/22/2021 ejection fraction roughly 55% normal systolic function mild aortic valve regurgitation trace mitral valve regurgitation exercise stress test dated 10/22/2021 no interpretation.  PET stress test July 2021 unremarkable.  Please see scanned records that I have reviewed and signed off on today.  All of this in addition to the patient's unique history and exam has been taken into consideration in determining their appropriate candidacy for MBS.    Complications  of laparoscopic/possible robotic gastric sleeve were discussed. The patient is well aware of the  "potential complications of surgery that include but not limited to bleeding, infections, deep venous thrombosis, pulmonary embolism, pulmonary complications such as pneumonia, cardiac events, hernias, small bowel obstruction, damage to the spleen or other organs, bowel injury, disfiguring scars, failure to lose weight, need for additional surgery, conversion to an open procedure, and death. Patient is also aware of complications which apply in this particular procedure that can include but are not limited to a \"leak\" at the staple line which in some instances may require conversion to gastric bypass.    The patient is aware if a hiatal hernia is encountered, it likely will be repaired.  R/B/A Rx to hiatal hernia repair were discussed as outlined in our long consent form.  Briefly risks in addition to those for LSG include recurrent hernia, IVANA, dysphagia, esophageal injury, pneumothorax, injury to the vagus nerves, injury to the thoracic duct, aorta or vena cava.    I discussed avoiding all tobacco products, nicotine,  and second hand smoke at least 2 weeks pre-operatively and 6 weeks post-operatively to minimize the risk of sleeve leak.  This included discussing the importance of avoiding even secondhand smoke as the risk of leak is increased.  Examples discussed:  Avoid going in a house or riding in a car where someone has previously smoked in the last 2 weeks and for 6 weeks postoperatively.  Avoid living in a house where someone smokes (even if it's in a separate room/patio/attached garage, etc.).   Avoid congregating with a group of people who are smoking even if it's outside.  It is OK to be around wood burning fires and barbecue.  I explained that I do not know if marijuana has a same effects but my overall recommendation is to avoid it for 2 weeks prior in 6 weeks after surgery.     Discussed the risks, benefits and alternative therapies at great length as outlined in our extensive consent forms, consent " videos, and educational teaching process under the direction of the center's .    A copy of the patient's signed informed consent is on file.    R/B/A Rx discussed to postop anticoagulation incl but not limited to bleeding, drug reaction, venothromboembolic events, etc. and the patient declined.        Plan:    After evaluation I think the patient is a reasonable candidate for laparoscopic sleeve gastrectomy and EGD.  [place].  Other issues include asthma, psoriatic arthritis, history of blood transfusion, seasonal bronchitis, carpal tunnel syndrome, previous hypothyroidism, fatigue, fatty liver, fibromyalgia, generalized headaches/migraines, GE reflux disease, history of H. pylori gastritis, hypertension, kidney stones, dyspnea on exertion.    Thank you HAZEL Hurtado for the opportunity evaluate Mrs. Oliver.      Nolan Malcolm MD

## 2022-11-03 ENCOUNTER — PRE-ADMISSION TESTING (OUTPATIENT)
Dept: PREADMISSION TESTING | Facility: HOSPITAL | Age: 52
End: 2022-11-03

## 2022-11-03 VITALS — HEIGHT: 64 IN | BODY MASS INDEX: 45.32 KG/M2

## 2022-11-03 DIAGNOSIS — E66.01 MORBID OBESITY WITH BODY MASS INDEX OF 45.0-49.9 IN ADULT: ICD-10-CM

## 2022-11-03 LAB
ABO GROUP BLD: NORMAL
RH BLD: POSITIVE

## 2022-11-03 PROCEDURE — 87081 CULTURE SCREEN ONLY: CPT

## 2022-11-03 PROCEDURE — 86900 BLOOD TYPING SEROLOGIC ABO: CPT

## 2022-11-03 PROCEDURE — 86901 BLOOD TYPING SEROLOGIC RH(D): CPT

## 2022-11-03 PROCEDURE — 36415 COLL VENOUS BLD VENIPUNCTURE: CPT

## 2022-11-03 PROCEDURE — 93005 ELECTROCARDIOGRAM TRACING: CPT

## 2022-11-03 RX ORDER — MULTIPLE VITAMINS W/ MINERALS TAB 9MG-400MCG
1 TAB ORAL DAILY
COMMUNITY

## 2022-11-03 NOTE — PAT
"Called anesthesia phone and spoke with Hernesto Clement CRNA.  Informed that pt is to have a gastric sleeve done with Dr. Malcolm on 11/9/22.  Pt reports that she has not taken her coreg (as prescribed by her cardiologist) for three months.  Pt stated that she has not informed her cardiologist.  Pt reports that she told her PCP, and that she was told that hopefully she wouldn't need any medications after her surgery.  Also reviewed preliminary EKG done in [facility] today, and EKG done on 7/16/21 with CRNA.  Informed that pt has a cardiac clearance in place from Dr. Dow's office.  Hernesto stated that we need to find out if pt is still clear from a cardiac standpoint for surgery, or if she need to resume her coreg.  Also Hernesto requested that the cardiologist review the pt's EKG and note any changes.       Called Dr. Malcolm' office.  Spoke with Michaelle Estrada.  Stated to contact the medical exchange for further assistance from the provider on call.      Paged the medical exchange.  Received a call back from JALEESA Mchugh.  Reviewed conversation with with her that I had with the CRNA.  Michaelle stated that she would address this in the a.m. tomorrow.      It was noted on pt's \"surgical requirements\" sheet from Dr. Malcolm' office that PCP clearance was noted.  Unable to find note of this in the EMR.  Pt stated that she did obtain this from her PCP.  Asked pt to please have this faxed to [facility].  Pt verbalized understanding.   "

## 2022-11-03 NOTE — DISCHARGE INSTRUCTIONS
PAT PASS GIVEN/REVIEWED WITH PT.  VERBALIZED UNDERSTANDING OF THE FOLLOWING:  DO NOT EAT, DRINK, SMOKE, USE SMOKELESS TOBACCO OR CHEW GUM AFTER MIDNIGHT THE NIGHT BEFORE SURGERY.  THIS ALSO INCLUDES HARD CANDIES AND MINTS.    DO NOT SHAVE THE AREA TO BE OPERATED ON AT LEAST 48 HOURS PRIOR TO THE PROCEDURE.  DO NOT WEAR MAKE UP OR NAIL POLISH.  DO NOT LEAVE IN ANY PIERCING OR WEAR JEWELRY THE DAY OF SURGERY.      DO NOT USE ADHESIVES IF YOU WEAR DENTURES.    DO NOT WEAR EYE CONTACTS; BRING IN YOUR GLASSES.    ONLY TAKE MEDICATION THE MORNING OF YOUR PROCEDURE IF INSTRUCTED BY YOUR SURGEON WITH ENOUGH WATER TO SWALLOW THE MEDICATION.  IF YOUR SURGEON DID NOT SPECIFY WHICH MEDICATIONS TO TAKE, YOU WILL NEED TO CALL THEIR OFFICE FOR FURTHER INSTRUCTIONS AND DO AS THEY INSTRUCT.    LEAVE ANYTHING YOU CONSIDER VALUABLE AT HOME.    YOU WILL NEED TO ARRANGE FOR SOMEONE TO DRIVE YOU HOME AFTER SURGERY.  IT IS RECOMMENDED THAT YOU DO NOT DRIVE, WORK, DRINK ALCOHOL OR MAKE MAJOR DECISIONS FOR AT LEAST 24 HOURS AFTER YOUR PROCEDURE IS COMPLETE.      THE DAY OF YOUR PROCEDURE, BRING IN THE FOLLOWING IF APPLICABLE:   PICTURE ID AND INSURANCE/MEDICARE OR MEDICAID CARDS/ANY CO-PAY THAT MAY BE DUE   COPY OF ADVANCED DIRECTIVE/LIVING WILL/POWER OR    CPAP/BIPAP/INHALERS   SKIN PREP SHEET   YOUR PREADMISSION TESTING PASS (IF NOT A PHONE HISTORY)       Chlorhexadine wipes along with instruction/verification sheet given to pt.  Instructed pt to date, time, and initial the verification sheet once skin prep has been  completed, and to return to [facility] the day of the procedure.  Pt. Verbalizes understanding.  Introduction to anesthesia video viewed by pt in PAT.   ERAS (Enhanced Recovery After Surgery) education given/reviewed with the pt.  Pt instructed to drink a 20 ounce Gatorade or G2 (if diabetic), and to have completed 1 hour before arrival time.  Instructed to use any color of Gatorade, except for red.  Pt  verbalized understanding of teaching.

## 2022-11-04 ENCOUNTER — TELEPHONE (OUTPATIENT)
Dept: BARIATRICS/WEIGHT MGMT | Facility: CLINIC | Age: 52
End: 2022-11-04

## 2022-11-04 LAB
QT INTERVAL: 360 MS
QTC INTERVAL: 457 MS

## 2022-11-04 NOTE — TELEPHONE ENCOUNTER
"Note From [facility] - Radha Aleman RN:     \"Called anesthesia phone and spoke with Hernesto Clement CRNA.  Informed that pt is to have a gastric sleeve done with Dr. Malcolm on 11/9/22.  Pt reports that she has not taken her coreg (as prescribed by her cardiologist) for three months.  Pt stated that she has not informed her cardiologist.  Pt reports that she told her PCP, and that she was told that hopefully she wouldn't need any medications after her surgery.  Also reviewed preliminary EKG done in [facility] today, and EKG done on 7/16/21 with CRNA.  Informed that pt has a cardiac clearance in place from Dr. Dow's office.  Hernesto stated that we need to find out if pt is still clear from a cardiac standpoint for surgery, or if she need to resume her coreg.  Also Hernesto requested that the cardiologist review the pt's EKG and note any changes.        Called Dr. Malcolm' office.  Spoke with Michaelle Estrada.  Stated to contact the medical exchange for further assistance from the provider on call.       Paged the medical exchange.  Received a call back from JALEESA Mchugh.  Reviewed conversation with with her that I had with the CRNA.  Michaelle stated that she would address this in the a.m. tomorrow.\"    "

## 2022-11-04 NOTE — TELEPHONE ENCOUNTER
Pt returned my call, advised to contact cardiology for updated clearance, discuss Coreg, and review EKG. Pt understood with no further questions or concerns.

## 2022-11-05 LAB — MRSA SPEC QL CULT: NORMAL

## 2022-11-07 ENCOUNTER — TELEPHONE (OUTPATIENT)
Dept: CARDIOLOGY | Facility: CLINIC | Age: 52
End: 2022-11-07

## 2022-11-07 NOTE — TELEPHONE ENCOUNTER
"Pt needing Cardiac clearance for a Gastric sleeve laparoscopic and an EGD both under general anesthesia with block with Dr. Malcolm. Dr. Malcolm is also wanting us to review her presurgical EKG. Fax to [phone]     Pt also states she stopped taking Coreg due to not having refills for a few days , pt reports feeling good and her BP and HR has been \"normal\" . She reports still taking her other medications and can she continue not to take Coreg?  "

## 2022-11-07 NOTE — TELEPHONE ENCOUNTER
Stress test and echocardiogram from 10/2021 with no evidence of ischemia.  EF normal.  If patient is not having any chest pain or shortness of breath, she is okay to proceed with procedure.  If her blood pressure is well controlled and she is not having palpitations, okay to discontinue Coreg.  If her blood pressure is elevated, she will need to continue Coreg or we can initiate a different medication.

## 2022-11-09 ENCOUNTER — ANESTHESIA EVENT (OUTPATIENT)
Dept: PERIOP | Facility: HOSPITAL | Age: 52
End: 2022-11-09

## 2022-11-09 ENCOUNTER — ANESTHESIA EVENT CONVERTED (OUTPATIENT)
Dept: ANESTHESIOLOGY | Facility: HOSPITAL | Age: 52
End: 2022-11-09

## 2022-11-09 ENCOUNTER — HOSPITAL ENCOUNTER (INPATIENT)
Facility: HOSPITAL | Age: 52
LOS: 1 days | Discharge: HOME | End: 2022-11-10
Attending: SURGERY | Admitting: SURGERY

## 2022-11-09 ENCOUNTER — ANESTHESIA (OUTPATIENT)
Dept: PERIOP | Facility: HOSPITAL | Age: 52
End: 2022-11-09

## 2022-11-09 DIAGNOSIS — E66.01 MORBID OBESITY WITH BODY MASS INDEX (BMI) OF 45.0 TO 49.9 IN ADULT: Primary | ICD-10-CM

## 2022-11-09 DIAGNOSIS — E66.01 MORBID OBESITY WITH BODY MASS INDEX OF 45.0-49.9 IN ADULT: ICD-10-CM

## 2022-11-09 LAB
ABO GROUP BLD: NORMAL
BLD GP AB SCN SERPL QL: NEGATIVE
RH BLD: POSITIVE
T&S EXPIRATION DATE: NORMAL

## 2022-11-09 PROCEDURE — 0DJ08ZZ INSPECTION OF UPPER INTESTINAL TRACT, VIA NATURAL OR ARTIFICIAL OPENING ENDOSCOPIC: ICD-10-PCS | Performed by: SURGERY

## 2022-11-09 PROCEDURE — 25010000002 FENTANYL CITRATE (PF) 100 MCG/2ML SOLUTION: Performed by: NURSE ANESTHETIST, CERTIFIED REGISTERED

## 2022-11-09 PROCEDURE — 25010000002 HYDROMORPHONE 1 MG/ML SOLUTION: Performed by: NURSE ANESTHETIST, CERTIFIED REGISTERED

## 2022-11-09 PROCEDURE — 36415 COLL VENOUS BLD VENIPUNCTURE: CPT

## 2022-11-09 PROCEDURE — 25010000002 AMISULPRIDE (ANTIEMETIC) 5 MG/2ML SOLUTION: Performed by: NURSE ANESTHETIST, CERTIFIED REGISTERED

## 2022-11-09 PROCEDURE — 0DB64Z3 EXCISION OF STOMACH, PERCUTANEOUS ENDOSCOPIC APPROACH, VERTICAL: ICD-10-PCS | Performed by: SURGERY

## 2022-11-09 PROCEDURE — 43775 LAP SLEEVE GASTRECTOMY: CPT | Performed by: SURGERY

## 2022-11-09 PROCEDURE — 25010000002 PROPOFOL 200 MG/20ML EMULSION: Performed by: NURSE ANESTHETIST, CERTIFIED REGISTERED

## 2022-11-09 PROCEDURE — 88307 TISSUE EXAM BY PATHOLOGIST: CPT

## 2022-11-09 PROCEDURE — 25010000002 GLUCAGON (HUMAN RECOMBINANT) 1 MG RECONSTITUTED SOLUTION: Performed by: NURSE ANESTHETIST, CERTIFIED REGISTERED

## 2022-11-09 PROCEDURE — 25010000002 MIDAZOLAM PER 1MG: Performed by: NURSE ANESTHETIST, CERTIFIED REGISTERED

## 2022-11-09 PROCEDURE — 86850 RBC ANTIBODY SCREEN: CPT

## 2022-11-09 PROCEDURE — C9399 UNCLASSIFIED DRUGS OR BIOLOG: HCPCS | Performed by: NURSE ANESTHETIST, CERTIFIED REGISTERED

## 2022-11-09 PROCEDURE — 25010000002 ROPIVACAINE PER 1 MG: Performed by: NURSE ANESTHETIST, CERTIFIED REGISTERED

## 2022-11-09 PROCEDURE — 25010000002 DEXAMETHASONE PER 1 MG: Performed by: NURSE ANESTHETIST, CERTIFIED REGISTERED

## 2022-11-09 PROCEDURE — 86901 BLOOD TYPING SEROLOGIC RH(D): CPT

## 2022-11-09 PROCEDURE — 86900 BLOOD TYPING SEROLOGIC ABO: CPT

## 2022-11-09 PROCEDURE — 25010000002 LEVOFLOXACIN PER 250 MG: Performed by: SURGERY

## 2022-11-09 PROCEDURE — 25010000002 ENOXAPARIN PER 10 MG: Performed by: SURGERY

## 2022-11-09 PROCEDURE — 25010000002 ONDANSETRON PER 1 MG: Performed by: NURSE ANESTHETIST, CERTIFIED REGISTERED

## 2022-11-09 DEVICE — SEALANT WND FIBRIN TISSEEL PREFIL/SYR/PRIMAFZ 4ML: Type: IMPLANTABLE DEVICE | Site: ABDOMEN | Status: FUNCTIONAL

## 2022-11-09 DEVICE — IMPLANTABLE DEVICE
Type: IMPLANTABLE DEVICE | Site: ABDOMEN | Status: FUNCTIONAL
Brand: TITAN SGS STANDARD GASTRIC STAPLER

## 2022-11-09 RX ORDER — PROMETHAZINE HYDROCHLORIDE 12.5 MG/1
12.5 TABLET ORAL EVERY 6 HOURS PRN
Status: DISCONTINUED | OUTPATIENT
Start: 2022-11-09 | End: 2022-11-10 | Stop reason: HOSPADM

## 2022-11-09 RX ORDER — ACETAMINOPHEN 160 MG/5ML
1000 SOLUTION ORAL EVERY 8 HOURS SCHEDULED
Status: DISCONTINUED | OUTPATIENT
Start: 2022-11-09 | End: 2022-11-10 | Stop reason: HOSPADM

## 2022-11-09 RX ORDER — CLONIDINE HYDROCHLORIDE 0.1 MG/1
0.1 TABLET ORAL 2 TIMES DAILY
Status: DISCONTINUED | OUTPATIENT
Start: 2022-11-09 | End: 2022-11-10 | Stop reason: HOSPADM

## 2022-11-09 RX ORDER — ALBUTEROL SULFATE 2.5 MG/3ML
2.5 SOLUTION RESPIRATORY (INHALATION)
Status: DISCONTINUED | OUTPATIENT
Start: 2022-11-09 | End: 2022-11-09

## 2022-11-09 RX ORDER — ONDANSETRON 4 MG/1
4 TABLET, FILM COATED ORAL EVERY 4 HOURS PRN
Status: DISCONTINUED | OUTPATIENT
Start: 2022-11-09 | End: 2022-11-10 | Stop reason: HOSPADM

## 2022-11-09 RX ORDER — GABAPENTIN 250 MG/5ML
100 SOLUTION ORAL 3 TIMES DAILY
Status: DISCONTINUED | OUTPATIENT
Start: 2022-11-09 | End: 2022-11-10 | Stop reason: HOSPADM

## 2022-11-09 RX ORDER — ASCORBIC ACID 500 MG
500 TABLET ORAL DAILY
COMMUNITY
End: 2023-03-02

## 2022-11-09 RX ORDER — BUPIVACAINE HCL/0.9 % NACL/PF 0.125 %
PLASTIC BAG, INJECTION (ML) EPIDURAL AS NEEDED
Status: DISCONTINUED | OUTPATIENT
Start: 2022-11-09 | End: 2022-11-09 | Stop reason: SURG

## 2022-11-09 RX ORDER — HYDRALAZINE HYDROCHLORIDE 20 MG/ML
10 INJECTION INTRAMUSCULAR; INTRAVENOUS
Status: DISCONTINUED | OUTPATIENT
Start: 2022-11-09 | End: 2022-11-10 | Stop reason: HOSPADM

## 2022-11-09 RX ORDER — LORAZEPAM 0.5 MG/1
1 TABLET ORAL EVERY 12 HOURS PRN
Status: DISCONTINUED | OUTPATIENT
Start: 2022-11-09 | End: 2022-11-10 | Stop reason: HOSPADM

## 2022-11-09 RX ORDER — ONDANSETRON 2 MG/ML
4 INJECTION INTRAMUSCULAR; INTRAVENOUS ONCE AS NEEDED
Status: DISCONTINUED | OUTPATIENT
Start: 2022-11-09 | End: 2022-11-09 | Stop reason: HOSPADM

## 2022-11-09 RX ORDER — ACETAMINOPHEN 500 MG
1000 TABLET ORAL ONCE
Status: COMPLETED | OUTPATIENT
Start: 2022-11-09 | End: 2022-11-09

## 2022-11-09 RX ORDER — MAGNESIUM HYDROXIDE 1200 MG/15ML
LIQUID ORAL AS NEEDED
Status: DISCONTINUED | OUTPATIENT
Start: 2022-11-09 | End: 2022-11-09 | Stop reason: HOSPADM

## 2022-11-09 RX ORDER — DEXAMETHASONE SODIUM PHOSPHATE 4 MG/ML
INJECTION, SOLUTION INTRA-ARTICULAR; INTRALESIONAL; INTRAMUSCULAR; INTRAVENOUS; SOFT TISSUE AS NEEDED
Status: DISCONTINUED | OUTPATIENT
Start: 2022-11-09 | End: 2022-11-09 | Stop reason: SURG

## 2022-11-09 RX ORDER — CETIRIZINE HYDROCHLORIDE 10 MG/1
10 TABLET ORAL DAILY
Status: DISCONTINUED | OUTPATIENT
Start: 2022-11-09 | End: 2022-11-10 | Stop reason: HOSPADM

## 2022-11-09 RX ORDER — GABAPENTIN 100 MG/1
100 CAPSULE ORAL 3 TIMES DAILY
Status: DISCONTINUED | OUTPATIENT
Start: 2022-11-09 | End: 2022-11-10 | Stop reason: HOSPADM

## 2022-11-09 RX ORDER — EPHEDRINE SULFATE 5 MG/ML
INJECTION INTRAVENOUS AS NEEDED
Status: DISCONTINUED | OUTPATIENT
Start: 2022-11-09 | End: 2022-11-09 | Stop reason: SURG

## 2022-11-09 RX ORDER — VENLAFAXINE HYDROCHLORIDE 75 MG/1
75 CAPSULE, EXTENDED RELEASE ORAL DAILY
COMMUNITY
End: 2023-03-02

## 2022-11-09 RX ORDER — LIDOCAINE HYDROCHLORIDE 20 MG/ML
INJECTION, SOLUTION INTRAVENOUS AS NEEDED
Status: DISCONTINUED | OUTPATIENT
Start: 2022-11-09 | End: 2022-11-09 | Stop reason: SURG

## 2022-11-09 RX ORDER — DIPHENHYDRAMINE HYDROCHLORIDE 50 MG/ML
25 INJECTION INTRAMUSCULAR; INTRAVENOUS EVERY 4 HOURS PRN
Status: DISCONTINUED | OUTPATIENT
Start: 2022-11-09 | End: 2022-11-10 | Stop reason: HOSPADM

## 2022-11-09 RX ORDER — ONDANSETRON 2 MG/ML
INJECTION INTRAMUSCULAR; INTRAVENOUS AS NEEDED
Status: DISCONTINUED | OUTPATIENT
Start: 2022-11-09 | End: 2022-11-09 | Stop reason: SURG

## 2022-11-09 RX ORDER — ENOXAPARIN SODIUM 100 MG/ML
40 INJECTION SUBCUTANEOUS DAILY
Status: DISCONTINUED | OUTPATIENT
Start: 2022-11-10 | End: 2022-11-10 | Stop reason: HOSPADM

## 2022-11-09 RX ORDER — PANTOPRAZOLE SODIUM 40 MG/10ML
40 INJECTION, POWDER, LYOPHILIZED, FOR SOLUTION INTRAVENOUS
Status: DISCONTINUED | OUTPATIENT
Start: 2022-11-10 | End: 2022-11-10 | Stop reason: HOSPADM

## 2022-11-09 RX ORDER — CHOLECALCIFEROL (VITAMIN D3) 50 MCG
2000 TABLET ORAL DAILY
COMMUNITY

## 2022-11-09 RX ORDER — HYDROMORPHONE HCL 110MG/55ML
1 PATIENT CONTROLLED ANALGESIA SYRINGE INTRAVENOUS
Status: DISCONTINUED | OUTPATIENT
Start: 2022-11-09 | End: 2022-11-09

## 2022-11-09 RX ORDER — MEPERIDINE HYDROCHLORIDE 25 MG/ML
12.5 INJECTION INTRAMUSCULAR; INTRAVENOUS; SUBCUTANEOUS
Status: DISCONTINUED | OUTPATIENT
Start: 2022-11-09 | End: 2022-11-09 | Stop reason: HOSPADM

## 2022-11-09 RX ORDER — ALPRAZOLAM 0.25 MG/1
0.25 TABLET ORAL ONCE AS NEEDED
Status: DISCONTINUED | OUTPATIENT
Start: 2022-11-09 | End: 2022-11-10 | Stop reason: HOSPADM

## 2022-11-09 RX ORDER — NALOXONE HCL 0.4 MG/ML
0.1 VIAL (ML) INJECTION
Status: DISCONTINUED | OUTPATIENT
Start: 2022-11-09 | End: 2022-11-10 | Stop reason: HOSPADM

## 2022-11-09 RX ORDER — ONDANSETRON 2 MG/ML
4 INJECTION INTRAMUSCULAR; INTRAVENOUS EVERY 4 HOURS PRN
Status: DISCONTINUED | OUTPATIENT
Start: 2022-11-09 | End: 2022-11-10 | Stop reason: HOSPADM

## 2022-11-09 RX ORDER — LORAZEPAM 2 MG/ML
1 INJECTION INTRAMUSCULAR
Status: DISCONTINUED | OUTPATIENT
Start: 2022-11-09 | End: 2022-11-09 | Stop reason: HOSPADM

## 2022-11-09 RX ORDER — OXYCODONE HYDROCHLORIDE 5 MG/1
5 TABLET ORAL EVERY 6 HOURS PRN
Status: DISCONTINUED | OUTPATIENT
Start: 2022-11-09 | End: 2022-11-10 | Stop reason: HOSPADM

## 2022-11-09 RX ORDER — SCOLOPAMINE TRANSDERMAL SYSTEM 1 MG/1
1 PATCH, EXTENDED RELEASE TRANSDERMAL ONCE
Status: DISCONTINUED | OUTPATIENT
Start: 2022-11-09 | End: 2022-11-09

## 2022-11-09 RX ORDER — SIMETHICONE 80 MG
80 TABLET,CHEWABLE ORAL 4 TIMES DAILY PRN
Status: DISCONTINUED | OUTPATIENT
Start: 2022-11-09 | End: 2022-11-10 | Stop reason: HOSPADM

## 2022-11-09 RX ORDER — GABAPENTIN 300 MG/1
600 CAPSULE ORAL ONCE
Status: COMPLETED | OUTPATIENT
Start: 2022-11-09 | End: 2022-11-09

## 2022-11-09 RX ORDER — ZINC GLUCONATE 50 MG
50 TABLET ORAL DAILY
COMMUNITY

## 2022-11-09 RX ORDER — AMLODIPINE BESYLATE 5 MG/1
10 TABLET ORAL DAILY
Status: DISCONTINUED | OUTPATIENT
Start: 2022-11-09 | End: 2022-11-10 | Stop reason: HOSPADM

## 2022-11-09 RX ORDER — PROPOFOL 10 MG/ML
INJECTION, EMULSION INTRAVENOUS AS NEEDED
Status: DISCONTINUED | OUTPATIENT
Start: 2022-11-09 | End: 2022-11-09 | Stop reason: SURG

## 2022-11-09 RX ORDER — SODIUM CHLORIDE 9 MG/ML
INJECTION, SOLUTION INTRAVENOUS AS NEEDED
Status: DISCONTINUED | OUTPATIENT
Start: 2022-11-09 | End: 2022-11-09 | Stop reason: HOSPADM

## 2022-11-09 RX ORDER — BUPIVACAINE HYDROCHLORIDE AND EPINEPHRINE 5; 5 MG/ML; UG/ML
INJECTION, SOLUTION EPIDURAL; INTRACAUDAL; PERINEURAL AS NEEDED
Status: DISCONTINUED | OUTPATIENT
Start: 2022-11-09 | End: 2022-11-09 | Stop reason: HOSPADM

## 2022-11-09 RX ORDER — KETAMINE HYDROCHLORIDE 50 MG/ML
INJECTION, SOLUTION, CONCENTRATE INTRAMUSCULAR; INTRAVENOUS AS NEEDED
Status: DISCONTINUED | OUTPATIENT
Start: 2022-11-09 | End: 2022-11-09 | Stop reason: SURG

## 2022-11-09 RX ORDER — FENTANYL CITRATE 50 UG/ML
INJECTION, SOLUTION INTRAMUSCULAR; INTRAVENOUS AS NEEDED
Status: DISCONTINUED | OUTPATIENT
Start: 2022-11-09 | End: 2022-11-09 | Stop reason: SURG

## 2022-11-09 RX ORDER — VENLAFAXINE 75 MG/1
75 TABLET ORAL 2 TIMES DAILY
Status: DISCONTINUED | OUTPATIENT
Start: 2022-11-09 | End: 2022-11-10 | Stop reason: HOSPADM

## 2022-11-09 RX ORDER — LORAZEPAM 2 MG/ML
0.5 INJECTION INTRAMUSCULAR EVERY 12 HOURS PRN
Status: DISCONTINUED | OUTPATIENT
Start: 2022-11-09 | End: 2022-11-10 | Stop reason: HOSPADM

## 2022-11-09 RX ORDER — LEVOFLOXACIN 5 MG/ML
500 INJECTION, SOLUTION INTRAVENOUS EVERY 24 HOURS
Status: COMPLETED | OUTPATIENT
Start: 2022-11-09 | End: 2022-11-09

## 2022-11-09 RX ORDER — SODIUM CHLORIDE AND POTASSIUM CHLORIDE 150; 450 MG/100ML; MG/100ML
125 INJECTION, SOLUTION INTRAVENOUS CONTINUOUS
Status: DISCONTINUED | OUTPATIENT
Start: 2022-11-10 | End: 2022-11-10 | Stop reason: HOSPADM

## 2022-11-09 RX ORDER — ROPIVACAINE HYDROCHLORIDE 5 MG/ML
INJECTION, SOLUTION EPIDURAL; INFILTRATION; PERINEURAL
Status: COMPLETED | OUTPATIENT
Start: 2022-11-09 | End: 2022-11-09

## 2022-11-09 RX ORDER — CHLORHEXIDINE GLUCONATE 0.12 MG/ML
30 RINSE ORAL
Status: COMPLETED | OUTPATIENT
Start: 2022-11-09 | End: 2022-11-09

## 2022-11-09 RX ORDER — CYANOCOBALAMIN 1000 UG/ML
1000 INJECTION, SOLUTION INTRAMUSCULAR; SUBCUTANEOUS ONCE
Status: COMPLETED | OUTPATIENT
Start: 2022-11-10 | End: 2022-11-10

## 2022-11-09 RX ORDER — SODIUM CHLORIDE, SODIUM LACTATE, POTASSIUM CHLORIDE, CALCIUM CHLORIDE 600; 310; 30; 20 MG/100ML; MG/100ML; MG/100ML; MG/100ML
150 INJECTION, SOLUTION INTRAVENOUS CONTINUOUS
Status: DISCONTINUED | OUTPATIENT
Start: 2022-11-09 | End: 2022-11-09

## 2022-11-09 RX ORDER — DULOXETIN HYDROCHLORIDE 30 MG/1
60 CAPSULE, DELAYED RELEASE ORAL DAILY
Status: DISCONTINUED | OUTPATIENT
Start: 2022-11-09 | End: 2022-11-10 | Stop reason: HOSPADM

## 2022-11-09 RX ORDER — MIDAZOLAM HYDROCHLORIDE 2 MG/2ML
INJECTION, SOLUTION INTRAMUSCULAR; INTRAVENOUS AS NEEDED
Status: DISCONTINUED | OUTPATIENT
Start: 2022-11-09 | End: 2022-11-09 | Stop reason: SURG

## 2022-11-09 RX ORDER — ACETAMINOPHEN 500 MG
1000 TABLET ORAL EVERY 8 HOURS SCHEDULED
Status: DISCONTINUED | OUTPATIENT
Start: 2022-11-09 | End: 2022-11-10 | Stop reason: HOSPADM

## 2022-11-09 RX ORDER — PANTOPRAZOLE SODIUM 40 MG/10ML
40 INJECTION, POWDER, LYOPHILIZED, FOR SOLUTION INTRAVENOUS ONCE
Status: COMPLETED | OUTPATIENT
Start: 2022-11-09 | End: 2022-11-09

## 2022-11-09 RX ORDER — ENOXAPARIN SODIUM 100 MG/ML
INJECTION SUBCUTANEOUS AS NEEDED
Status: DISCONTINUED | OUTPATIENT
Start: 2022-11-09 | End: 2022-11-09 | Stop reason: HOSPADM

## 2022-11-09 RX ORDER — METOCLOPRAMIDE HYDROCHLORIDE 5 MG/ML
10 INJECTION INTRAMUSCULAR; INTRAVENOUS EVERY 6 HOURS PRN
Status: DISCONTINUED | OUTPATIENT
Start: 2022-11-09 | End: 2022-11-10 | Stop reason: HOSPADM

## 2022-11-09 RX ORDER — ENOXAPARIN SODIUM 100 MG/ML
40 INJECTION SUBCUTANEOUS ONCE
Status: DISCONTINUED | OUTPATIENT
Start: 2022-11-09 | End: 2022-11-09 | Stop reason: HOSPADM

## 2022-11-09 RX ORDER — SODIUM CHLORIDE, SODIUM LACTATE, POTASSIUM CHLORIDE, CALCIUM CHLORIDE 600; 310; 30; 20 MG/100ML; MG/100ML; MG/100ML; MG/100ML
150 INJECTION, SOLUTION INTRAVENOUS CONTINUOUS
Status: DISCONTINUED | OUTPATIENT
Start: 2022-11-09 | End: 2022-11-10 | Stop reason: HOSPADM

## 2022-11-09 RX ORDER — HYDROMORPHONE HYDROCHLORIDE 2 MG/1
2 TABLET ORAL EVERY 4 HOURS PRN
Status: DISCONTINUED | OUTPATIENT
Start: 2022-11-09 | End: 2022-11-10 | Stop reason: HOSPADM

## 2022-11-09 RX ORDER — ONDANSETRON 4 MG/1
4 TABLET, FILM COATED ORAL EVERY 6 HOURS PRN
Status: DISCONTINUED | OUTPATIENT
Start: 2022-11-13 | End: 2022-11-10 | Stop reason: HOSPADM

## 2022-11-09 RX ORDER — SODIUM CHLORIDE 0.9 % (FLUSH) 0.9 %
3 SYRINGE (ML) INJECTION EVERY 12 HOURS SCHEDULED
Status: DISCONTINUED | OUTPATIENT
Start: 2022-11-09 | End: 2022-11-09 | Stop reason: HOSPADM

## 2022-11-09 RX ORDER — SODIUM CHLORIDE 0.9 % (FLUSH) 0.9 %
3-10 SYRINGE (ML) INJECTION AS NEEDED
Status: DISCONTINUED | OUTPATIENT
Start: 2022-11-09 | End: 2022-11-09 | Stop reason: HOSPADM

## 2022-11-09 RX ORDER — NALOXONE HCL 0.4 MG/ML
0.4 VIAL (ML) INJECTION
Status: DISCONTINUED | OUTPATIENT
Start: 2022-11-09 | End: 2022-11-10 | Stop reason: HOSPADM

## 2022-11-09 RX ORDER — ROCURONIUM BROMIDE 10 MG/ML
INJECTION, SOLUTION INTRAVENOUS AS NEEDED
Status: DISCONTINUED | OUTPATIENT
Start: 2022-11-09 | End: 2022-11-09 | Stop reason: SURG

## 2022-11-09 RX ORDER — ALBUTEROL SULFATE 2.5 MG/3ML
2.5 SOLUTION RESPIRATORY (INHALATION) EVERY 6 HOURS PRN
Status: DISCONTINUED | OUTPATIENT
Start: 2022-11-09 | End: 2022-11-10 | Stop reason: HOSPADM

## 2022-11-09 RX ADMIN — AMLODIPINE BESYLATE 10 MG: 5 TABLET ORAL at 12:34

## 2022-11-09 RX ADMIN — ACETAMINOPHEN 1000 MG: 500 TABLET, FILM COATED ORAL at 14:04

## 2022-11-09 RX ADMIN — CETIRIZINE HYDROCHLORIDE 10 MG: 10 TABLET, FILM COATED ORAL at 12:34

## 2022-11-09 RX ADMIN — 0.12% CHLORHEXIDINE GLUCONATE 30 ML: 1.2 RINSE ORAL at 06:49

## 2022-11-09 RX ADMIN — LIDOCAINE HYDROCHLORIDE 60 MG: 20 INJECTION, SOLUTION INTRAVENOUS at 07:43

## 2022-11-09 RX ADMIN — ROCURONIUM BROMIDE 10 MG: 10 INJECTION INTRAVENOUS at 08:02

## 2022-11-09 RX ADMIN — SODIUM CHLORIDE, POTASSIUM CHLORIDE, SODIUM LACTATE AND CALCIUM CHLORIDE 150 ML/HR: 600; 310; 30; 20 INJECTION, SOLUTION INTRAVENOUS at 22:40

## 2022-11-09 RX ADMIN — KETAMINE HYDROCHLORIDE 10 MG: 50 INJECTION, SOLUTION INTRAMUSCULAR; INTRAVENOUS at 07:44

## 2022-11-09 RX ADMIN — EPHEDRINE SULFATE 10 MG: 5 INJECTION INTRAVENOUS at 07:59

## 2022-11-09 RX ADMIN — SODIUM CHLORIDE, POTASSIUM CHLORIDE, SODIUM LACTATE AND CALCIUM CHLORIDE 1000 ML: 600; 310; 30; 20 INJECTION, SOLUTION INTRAVENOUS at 06:46

## 2022-11-09 RX ADMIN — GLUCAGON HYDROCHLORIDE 1 MG: KIT at 08:11

## 2022-11-09 RX ADMIN — GABAPENTIN 100 MG: 100 CAPSULE ORAL at 15:53

## 2022-11-09 RX ADMIN — PROPOFOL 200 MG: 10 INJECTION, EMULSION INTRAVENOUS at 07:43

## 2022-11-09 RX ADMIN — VENLAFAXINE 75 MG: 75 TABLET ORAL at 12:34

## 2022-11-09 RX ADMIN — GABAPENTIN 600 MG: 300 CAPSULE ORAL at 06:47

## 2022-11-09 RX ADMIN — ROPIVACAINE HYDROCHLORIDE 30 ML: 5 INJECTION, SOLUTION EPIDURAL; INFILTRATION; PERINEURAL at 07:46

## 2022-11-09 RX ADMIN — ACETAMINOPHEN 1000 MG: 500 TABLET, FILM COATED ORAL at 22:04

## 2022-11-09 RX ADMIN — LEVOFLOXACIN 500 MG: 5 INJECTION, SOLUTION INTRAVENOUS at 07:38

## 2022-11-09 RX ADMIN — AMISULPRIDE 10 MG: 2.5 INJECTION, SOLUTION INTRAVENOUS at 08:32

## 2022-11-09 RX ADMIN — GABAPENTIN 100 MG: 100 CAPSULE ORAL at 12:34

## 2022-11-09 RX ADMIN — FENTANYL CITRATE 50 MCG: 50 INJECTION INTRAMUSCULAR; INTRAVENOUS at 08:02

## 2022-11-09 RX ADMIN — ROCURONIUM BROMIDE 40 MG: 10 INJECTION INTRAVENOUS at 07:43

## 2022-11-09 RX ADMIN — SUGAMMADEX 200 MG: 100 INJECTION, SOLUTION INTRAVENOUS at 08:35

## 2022-11-09 RX ADMIN — SODIUM CHLORIDE, POTASSIUM CHLORIDE, SODIUM LACTATE AND CALCIUM CHLORIDE: 600; 310; 30; 20 INJECTION, SOLUTION INTRAVENOUS at 08:02

## 2022-11-09 RX ADMIN — GABAPENTIN 100 MG: 100 CAPSULE ORAL at 20:17

## 2022-11-09 RX ADMIN — SODIUM CHLORIDE, POTASSIUM CHLORIDE, SODIUM LACTATE AND CALCIUM CHLORIDE 150 ML/HR: 600; 310; 30; 20 INJECTION, SOLUTION INTRAVENOUS at 15:52

## 2022-11-09 RX ADMIN — Medication 100 MCG: at 07:59

## 2022-11-09 RX ADMIN — ONDANSETRON 4 MG: 2 INJECTION INTRAMUSCULAR; INTRAVENOUS at 07:36

## 2022-11-09 RX ADMIN — DEXAMETHASONE SODIUM PHOSPHATE 8 MG: 4 INJECTION, SOLUTION INTRAMUSCULAR; INTRAVENOUS at 08:02

## 2022-11-09 RX ADMIN — FENTANYL CITRATE 50 MCG: 50 INJECTION INTRAMUSCULAR; INTRAVENOUS at 07:43

## 2022-11-09 RX ADMIN — 0.12% CHLORHEXIDINE GLUCONATE 30 ML: 1.2 RINSE ORAL at 06:56

## 2022-11-09 RX ADMIN — PANTOPRAZOLE SODIUM 40 MG: 40 INJECTION, POWDER, FOR SOLUTION INTRAVENOUS at 06:51

## 2022-11-09 RX ADMIN — Medication 100 MCG: at 08:04

## 2022-11-09 RX ADMIN — CLONIDINE HYDROCHLORIDE 0.1 MG: 0.1 TABLET ORAL at 12:34

## 2022-11-09 RX ADMIN — SCOPALAMINE 1 PATCH: 1 PATCH, EXTENDED RELEASE TRANSDERMAL at 06:54

## 2022-11-09 RX ADMIN — SODIUM CHLORIDE, POTASSIUM CHLORIDE, SODIUM LACTATE AND CALCIUM CHLORIDE 150 ML/HR: 600; 310; 30; 20 INJECTION, SOLUTION INTRAVENOUS at 09:05

## 2022-11-09 RX ADMIN — EPHEDRINE SULFATE 10 MG: 5 INJECTION INTRAVENOUS at 08:04

## 2022-11-09 RX ADMIN — HYDROMORPHONE HYDROCHLORIDE 0.5 MG: 1 INJECTION, SOLUTION INTRAMUSCULAR; INTRAVENOUS; SUBCUTANEOUS at 09:04

## 2022-11-09 RX ADMIN — ACETAMINOPHEN 1000 MG: 500 TABLET, FILM COATED ORAL at 06:47

## 2022-11-09 RX ADMIN — SODIUM CHLORIDE, POTASSIUM CHLORIDE, SODIUM LACTATE AND CALCIUM CHLORIDE 150 ML/HR: 600; 310; 30; 20 INJECTION, SOLUTION INTRAVENOUS at 07:11

## 2022-11-09 RX ADMIN — KETAMINE HYDROCHLORIDE 10 MG: 50 INJECTION, SOLUTION INTRAMUSCULAR; INTRAVENOUS at 08:00

## 2022-11-09 RX ADMIN — VENLAFAXINE 75 MG: 75 TABLET ORAL at 20:17

## 2022-11-09 RX ADMIN — MIDAZOLAM HYDROCHLORIDE 2 MG: 1 INJECTION, SOLUTION INTRAMUSCULAR; INTRAVENOUS at 07:36

## 2022-11-09 RX ADMIN — CLONIDINE HYDROCHLORIDE 0.1 MG: 0.1 TABLET ORAL at 20:17

## 2022-11-09 NOTE — PLAN OF CARE
Goal Outcome Evaluation:  Plan of Care Reviewed With: patient        Progress: improving  Outcome Evaluation: New admission. Patient has ambulated around room, to bathroom, and up and down [place] multiple times today. No complaints of pain while on the floor. IV fluids recieved as ordered. No acute events while on the floor. Will continue to monitor.   Patient has chewed gum and used incentive spirometer as directed.

## 2022-11-09 NOTE — ANESTHESIA PROCEDURE NOTES
Peripheral Block      Patient reassessed immediately prior to procedure    Start time: 11/9/2022 7:46 AM  Stop time: 11/9/2022 7:50 AM  Reason for block: at surgeon's request and post-op pain management  Performed by  CRNA/CAA: Geovani Palm CRNA  Preanesthetic Checklist  Completed: patient identified, IV checked, site marked, risks and benefits discussed, surgical consent, monitors and equipment checked, pre-op evaluation and timeout performed  Prep:  Pt Position: supine  Sterile barriers:gloves, cap, sterile barriers and mask  Prep: ChloraPrep  Patient monitoring: blood pressure monitoring, continuous pulse oximetry and EKG  Procedure    Sedation: yes  Performed under: general  Guidance:ultrasound guided    ULTRASOUND INTERPRETATION.  Using ultrasound guidance a 21 G gauge needle was placed in close proximity to the nerve, at which point, under ultrasound guidance anesthetic was injected in the area of the nerve and spread of the anesthesia was seen on ultrasound in close proximity thereto.  There were no abnormalities seen on ultrasound; a digital image was taken; and the patient tolerated the procedure with no complications. Images:still images obtained    Laterality:Bilateral  Block Type:TAP  Injection Technique:single-shot  Needle Type:echogenic  Resistance on Injection: none    Medications Used: ropivacaine (NAROPIN) injection 0.5 % - Peripheral Nerve   30 mL - 11/9/2022 7:46:00 AM      Medications  Preservative Free Saline:30ml  Comment:Block Injection: LA dose divided between Right and Left Block  Adjuncts per total volume of LA:    Decadron 10mg PSF      If required, intravenous sedation was given -- see meds on anesthesia record.    Post Assessment  Injection Assessment: negative aspiration for heme, no paresthesia on injection and incremental injection  Patient Tolerance:comfortable throughout block  Complications:no  Additional Notes  Procedure:      BILATERAL TAP BLOCKS                              Patient analgesia was achieved with General Anesthesia    The pt was placed in the Supine Position and under Ultrasound guidance, an echogenic or touhy needle was advanced with Normal Saline hydro dissection of tissue.  The Internal Oblique and Transversus Abdominus muscles were visualized.  At or before the aponeurosis of Internal Oblique, the local anesthetic spread was visualized in the Transversus Abdominus Plane. Injection was made incrementally with aspiration every 5 mls.  There was no intravascular injection;  injection pressure was normal; there was no neural injection; and the procedure was completed without difficulty.

## 2022-11-09 NOTE — BRIEF OP NOTE
GASTRIC SLEEVE LAPAROSCOPIC, ESOPHAGOGASTRODUODENOSCOPY  Progress Note    Nancy Oliver  11/9/2022    Pre-op Diagnosis:   Morbid obesity with body mass index of 45.0-49.9 in adult ([facility]) [E66.01, Z68.42]       Post-Op Diagnosis Codes:     * Morbid obesity with body mass index of 45.0-49.9 in adult ([facility]) [E66.01, Z68.42]    Procedure/CPT® Codes:  VT LAP, GAYATHRI RESTRICT PROC, LONGITUDINAL GASTRECTOMY [69802]  VT ESOPHAGOGASTRODUODENOSCOPY TRANSORAL DIAGNOSTIC [99601]      Procedure(s):  GASTRIC SLEEVE LAPAROSCOPIC  ESOPHAGOGASTRODUODENOSCOPY        Surgeon(s):  Nolan Malcolm MD    Anesthesia: General with Block    Staff:   Circulator: Lluvia Guy RN; Adriana Steele, DONTAE  Scrub Person: Jorge Faye; Nanci Mauricio         Estimated Blood Loss: 3 mL    Urine Voided: * No values recorded between 11/9/2022  7:37 AM and 11/9/2022  8:39 AM *    Specimens:                Specimens     ID Source Type Tests Collected By Collected At Frozen?    A Stomach Tissue · TISSUE EXAM, [facility])   Adriana Steele, RN 11/9/22 3617 No    Description: SUB-TOTAL GASTRECTOMY    This specimen was not marked as sent.                Drains: * No LDAs found *    Findings:         Complications: none          Nolan Malcolm MD     Date: 11/9/2022  Time: 08:47 EST

## 2022-11-09 NOTE — ANESTHESIA PREPROCEDURE EVALUATION
Anesthesia Evaluation     Patient summary reviewed and Nursing notes reviewed   NPO Solid Status: > 8 hours  NPO Liquid Status: > 2 hours           Airway   Mallampati: III  TM distance: >3 FB  Neck ROM: full  Possible difficult intubation  Dental - normal exam     Pulmonary    (+) asthma,shortness of breath, decreased breath sounds,   Cardiovascular - normal exam    ECG reviewed    (+) hypertension, CHF ,       Neuro/Psych  (+) headaches, numbness,    GI/Hepatic/Renal/Endo    (+) obesity, morbid obesity, GERD, GI bleeding , liver disease fatty liver disease, renal disease stones, thyroid problem hypothyroidism    Musculoskeletal     Abdominal   (+) obese,    Substance History      OB/GYN          Other   arthritis,                      Anesthesia Plan    ASA 3     general with block     (Risks and benefits discussed including risk of aspiration, recall and dental damage. All patient questions answered.    Will continue with plan of care.    Risks and benefits of peripheral nerve blocks for pain control explained to patient to include infection, bleeding at the site, paresthesia, damage to nerves, and incomplete analgesia.)  intravenous induction     Anesthetic plan, risks, benefits, and alternatives have been provided, discussed and informed consent has been obtained with: patient.  Pre-procedure education provided  Plan discussed with CRNA.        CODE STATUS:

## 2022-11-09 NOTE — OP NOTE
Preoperative Diagnosis:   Morbid Obesity (120 kg, BMI 45.32) with Multiple Co-Morbidities    Postoperative Diagnosis:   Same    Procedure:                                                   Laparoscopic Sleeve Gastrectomy (85% subtotal vertical gastrectomy, Titan (B37A9)                                                                        EGD                                                                       Surgeon:                                                       DARIN Malcolm MD    Anesthesia:                                                   GETA    EBL:                                                              Minimal    Fluids:                                                           Crystalloid    Specimens:                                                   Subtotal gastrectomy    Drains:                                                           None    Counts:                                                          Correct    Complications:                                               None    Indications:   This is a 52 year-old morbidly obese female who presents for elective laparoscopic sleeve gastrectomy.  She has undergone our extensive preoperative education teaching and consent process everything is in order and she wishes to proceed.      Operative technique:     The patient was brought to the operating room, and placed supine upon the operating room table.  SCD hose were placed, she underwent uneventful general endotracheal anesthesia per the anesthesiology staff, she received IV Levaquin and subcutaneous Lovenox, the anesthesiology staff performed a tap block, and her abdomen was prepped and draped with ChloraPrep in a sterile fashion, an Ioban was used as well, a Kelly catheter was not placed.    The peritoneal cavity was entered in the left upper quadrant using a 5 mm trocar utilizing an Optiview technique and the abdomen was insufflated to a pressure of 15 mmHg with CO2 gas.   Exploratory laparoscopy revealed no evidence of injury from the entrance technique, a mildly enlarged smooth fatty liver, status postcholecystectomy, some omental adhesions to the anterior abdominal wall in the lower abdomen to the left of the umbilicus, mild rectus diastases.    Remaining trocars were placed under direct visualization including 5 mm trochars in the right, mid, and left lateral abdomen and after infiltration of the peritoneum with local anesthetic under direct visualization a 19 mm trocar was placed below into the right of the umbilicus off the midline away from the rectus diastases.    Through a stab incision in the epigastrium a Renny retractor was used to elevate the left lobe of the liver.  There was no visible hiatal hernia from the anterior view and this was photodocumented.   Beginning approximately two thirds of the way around the greater curvature the stomach, the gastrocolic vessels were divided using the Enseal device.  This proceeded proximally taking down all the short gastric vessels and exposing the left fazal. There were no posterior hernias or lipomas, but the hiatus did appear somewhat weak, and this was photodocumented.  1 mg glucagon IV given. Gastrocolic vessels were then divided medially to a few cm proximal to the pylorus.   Adhesions of the posterior stomach to the pancreas and retroperitoneum were divided.  The stomach was marked with a Kitner saturated with a marking pen 1 cm lateral to the angle of His, 3 cm from the angularis and 6 cm from the pylorus.   A 38 Maldivian balloon bougie was advanced into the distal antrum and the balloon insufflated with 15 cc of saline.    The Titan stapler was positioned along the markings with the calibration balloon centered at the marking at the angularis and the stapler was closed.  The calibration bougie was desufflated and removed.  The 85% subtotal vertical sleeve gastrectomy was then performed with a single firing using the Titan  stapler (B37A9).  The Titan stapler was removed.  The subtotal gastrectomy specimen was retrieved through the 19 mm trocar site incision, inspected, and sent unopened to pathology for permanent section.  It was a smaller than average size specimen.  The sleeve was submerged under saline.  Upper endoscopy was performed, and the endoscope was advanced into the duodenal bulb.  No air bubbles or leak seen, no bleeding at the staple line, no narrowing at the angularis, no pyloric spasm or deformity, no gastritis, no hiatal hernia or Patel's esophagus, Z-line roughly 34 cm, and the endoscope was withdrawn.   Irrigation fluid was suctioned free.  The sleeve was resting nicely and hemostatic.  The sleeve staple line was treated with 4 cc of aerosolized Tisseel fibrin glue.  Photodocumentation of the sleeve obtained before and after endoscopy.  10 mg of Barhemsys IV given.  The Renny retractor was removed. Fascia at the 19 mm trocar site incision was closed with a horizontal mattress 0 Vicryl suture placed with a suture passer under direct visualization and tying the knot extracorporeally.  Remaining trocars were removed under direct visualization, no bleeding noted from their sites.  Skin in each incision was closed using 3-0 Monocryl plus in an interrupted subcuticular stitch followed by skin glue.  The patient tolerated the procedure well without complication, was taken to the recovery room in stable condition.

## 2022-11-09 NOTE — ANESTHESIA PROCEDURE NOTES
Airway  Urgency: elective    Date/Time: 11/9/2022 7:45 AM  End Time:11/9/2022 7:46 AM  Airway not difficult    General Information and Staff    Patient location during procedure: OR  CRNA/CAA: Jose Daily CRNA    Indications and Patient Condition  Indications for airway management: airway protection    Preoxygenated: yes  MILS not maintained throughout  Mask difficulty assessment: 1 - vent by mask    Final Airway Details  Final airway type: endotracheal airway      Successful airway: ETT  Cuffed: yes   Successful intubation technique: direct laryngoscopy  Facilitating devices/methods: Bougie, cricoid pressure and anterior pressure/BURP  Endotracheal tube insertion site: oral  Blade: Srinivas  Blade size: 3  ETT size (mm): 7.0  Cormack-Lehane Classification: grade I - full view of glottis  Placement verified by: chest auscultation and capnometry   Cuff volume (mL): 7  Measured from: lips  ETT/EBT  to lips (cm): 20  Number of attempts at approach: 1  Assessment: lips, teeth, and gum same as pre-op and atraumatic intubation    Additional Comments  Negative epigastric sounds, Breath sound equal bilaterally with symmetric chest rise and fall

## 2022-11-09 NOTE — CASE MANAGEMENT/SOCIAL WORK
Discharge Planning Assessment  [facility]     Patient Name: Nancy Oliver  MRN: [id number]  Today's Date: 11/9/2022    Admit Date: 11/9/2022    Plan: CM met with pt, sitting up in chair. Pt is independent, works full time and drives. She denies d/c needs or concerns at this time.   Discharge Needs Assessment    No documentation.                Discharge Plan     Row Name 11/09/22 1534       Plan    Plan CM met with pt, sitting up in chair. Pt is independent, works full time and drives. She denies d/c needs or concerns at this time.              Continued Care and Services - Admitted Since 11/9/2022    Coordination has not been started for this encounter.          Demographic Summary     Row Name 11/09/22 1538       General Information    Admission Type inpatient    Arrived From home    Referral Source admission list    Reason for Consult discharge planning    Preferred Language English               Functional Status     Row Name 11/09/22 1532       Functional Status    Usual Activity Tolerance good    Current Activity Tolerance good       Functional Status, IADL    Medications independent    Meal Preparation independent    Housekeeping independent    Laundry independent    Shopping independent       Mental Status    General Appearance WDL WDL       Mental Status Summary    Recent Changes in Mental Status/Cognitive Functioning no changes       Employment/    Employment Status employed full-time               Psychosocial    No documentation.                Abuse/Neglect    No documentation.                Legal    No documentation.                Substance Abuse    No documentation.                Patient Forms    No documentation.                   Mechelle Mendes RN

## 2022-11-09 NOTE — ANESTHESIA POSTPROCEDURE EVALUATION
Patient: Nancy Oliver    Procedure Summary     Date: 11/09/22 Room / Location: [facility] OR  /  [facility]    Anesthesia Start: 0737 Anesthesia Stop: 0851    Procedures:       GASTRIC SLEEVE LAPAROSCOPIC (Abdomen)      ESOPHAGOGASTRODUODENOSCOPY Diagnosis:       Morbid obesity with body mass index of 45.0-49.9 in adult ([facility])      (Morbid obesity with body mass index of 45.0-49.9 in adult ([facility]) [E66.01, Z68.42])    Surgeons: Nolan Malcolm MD Provider: Jose Daily CRNA    Anesthesia Type: general with block ASA Status: 3          Anesthesia Type: general with block    Vitals  No vitals data found for the desired time range.          Post Anesthesia Care and Evaluation    Patient location during evaluation: PACU  Patient participation: complete - patient participated  Level of consciousness: awake and alert  Pain score: 2  Pain management: satisfactory to patient    Airway patency: patent  Anesthetic complications: No anesthetic complications  PONV Status: none  Cardiovascular status: acceptable and stable  Respiratory status: acceptable, nonlabored ventilation, spontaneous ventilation and unassisted  Hydration status: acceptable    Comments: Vitals signs as noted in nursing documentation as per protocol.

## 2022-11-10 ENCOUNTER — READMISSION MANAGEMENT (OUTPATIENT)
Dept: CALL CENTER | Facility: HOSPITAL | Age: 52
End: 2022-11-10

## 2022-11-10 ENCOUNTER — APPOINTMENT (OUTPATIENT)
Dept: GENERAL RADIOLOGY | Facility: HOSPITAL | Age: 52
End: 2022-11-10

## 2022-11-10 VITALS
HEIGHT: 64 IN | OXYGEN SATURATION: 92 % | DIASTOLIC BLOOD PRESSURE: 67 MMHG | RESPIRATION RATE: 18 BRPM | BODY MASS INDEX: 47.54 KG/M2 | HEART RATE: 89 BPM | WEIGHT: 278.44 LBS | TEMPERATURE: 98.2 F | SYSTOLIC BLOOD PRESSURE: 125 MMHG

## 2022-11-10 LAB
ALBUMIN SERPL-MCNC: 3.7 G/DL (ref 3.5–5.2)
ALBUMIN/GLOB SERPL: 1.1 G/DL
ALP SERPL-CCNC: 110 U/L (ref 39–117)
ALT SERPL W P-5'-P-CCNC: 249 U/L (ref 1–33)
ANION GAP SERPL CALCULATED.3IONS-SCNC: 8.5 MMOL/L (ref 5–15)
AST SERPL-CCNC: 149 U/L (ref 1–32)
BASOPHILS # BLD AUTO: 0.01 10*3/MM3 (ref 0–0.2)
BASOPHILS NFR BLD AUTO: 0.1 % (ref 0–1.5)
BILIRUB SERPL-MCNC: 0.2 MG/DL (ref 0–1.2)
BUN SERPL-MCNC: 11 MG/DL (ref 6–20)
BUN/CREAT SERPL: 13.6 (ref 7–25)
CALCIUM SPEC-SCNC: 9.1 MG/DL (ref 8.6–10.5)
CHLORIDE SERPL-SCNC: 103 MMOL/L (ref 98–107)
CO2 SERPL-SCNC: 27.5 MMOL/L (ref 22–29)
CREAT SERPL-MCNC: 0.81 MG/DL (ref 0.57–1)
DEPRECATED RDW RBC AUTO: 42.3 FL (ref 37–54)
EGFRCR SERPLBLD CKD-EPI 2021: 88 ML/MIN/1.73
EOSINOPHIL # BLD AUTO: 0 10*3/MM3 (ref 0–0.4)
EOSINOPHIL NFR BLD AUTO: 0 % (ref 0.3–6.2)
ERYTHROCYTE [DISTWIDTH] IN BLOOD BY AUTOMATED COUNT: 13.7 % (ref 12.3–15.4)
GLOBULIN UR ELPH-MCNC: 3.4 GM/DL
GLUCOSE SERPL-MCNC: 118 MG/DL (ref 65–99)
HCT VFR BLD AUTO: 38.9 % (ref 34–46.6)
HGB BLD-MCNC: 13 G/DL (ref 12–15.9)
IMM GRANULOCYTES # BLD AUTO: 0.06 10*3/MM3 (ref 0–0.05)
IMM GRANULOCYTES NFR BLD AUTO: 0.7 % (ref 0–0.5)
IRON 24H UR-MRATE: 52 MCG/DL (ref 37–145)
LYMPHOCYTES # BLD AUTO: 1.08 10*3/MM3 (ref 0.7–3.1)
LYMPHOCYTES NFR BLD AUTO: 11.9 % (ref 19.6–45.3)
MCH RBC QN AUTO: 28.4 PG (ref 26.6–33)
MCHC RBC AUTO-ENTMCNC: 33.4 G/DL (ref 31.5–35.7)
MCV RBC AUTO: 84.9 FL (ref 79–97)
MONOCYTES # BLD AUTO: 0.4 10*3/MM3 (ref 0.1–0.9)
MONOCYTES NFR BLD AUTO: 4.4 % (ref 5–12)
NEUTROPHILS NFR BLD AUTO: 7.54 10*3/MM3 (ref 1.7–7)
NEUTROPHILS NFR BLD AUTO: 82.9 % (ref 42.7–76)
NRBC BLD AUTO-RTO: 0 /100 WBC (ref 0–0.2)
PLATELET # BLD AUTO: 223 10*3/MM3 (ref 140–450)
PMV BLD AUTO: 10.7 FL (ref 6–12)
POTASSIUM SERPL-SCNC: 4.4 MMOL/L (ref 3.5–5.2)
PROT SERPL-MCNC: 7.1 G/DL (ref 6–8.5)
RBC # BLD AUTO: 4.58 10*6/MM3 (ref 3.77–5.28)
SODIUM SERPL-SCNC: 139 MMOL/L (ref 136–145)
WBC NRBC COR # BLD: 9.09 10*3/MM3 (ref 3.4–10.8)

## 2022-11-10 PROCEDURE — 80053 COMPREHEN METABOLIC PANEL: CPT | Performed by: SURGERY

## 2022-11-10 PROCEDURE — 25010000002 CYANOCOBALAMIN PER 1000 MCG: Performed by: SURGERY

## 2022-11-10 PROCEDURE — 83540 ASSAY OF IRON: CPT | Performed by: SURGERY

## 2022-11-10 PROCEDURE — 25010000002 THIAMINE PER 100 MG: Performed by: SURGERY

## 2022-11-10 PROCEDURE — 25010000002 ENOXAPARIN PER 10 MG: Performed by: SURGERY

## 2022-11-10 PROCEDURE — 0 POTASSIUM CHLORIDE PER 2 MEQ: Performed by: SURGERY

## 2022-11-10 PROCEDURE — 0 DIATRIZOATE MEGLUMINE & SODIUM PER 1 ML: Performed by: SURGERY

## 2022-11-10 PROCEDURE — 85025 COMPLETE CBC W/AUTO DIFF WBC: CPT | Performed by: SURGERY

## 2022-11-10 PROCEDURE — 99024 POSTOP FOLLOW-UP VISIT: CPT | Performed by: SURGERY

## 2022-11-10 PROCEDURE — 74240 X-RAY XM UPR GI TRC 1CNTRST: CPT

## 2022-11-10 RX ORDER — ONDANSETRON 4 MG/1
4 TABLET, ORALLY DISINTEGRATING ORAL EVERY 8 HOURS PRN
Qty: 10 TABLET | Refills: 0 | Status: SHIPPED | OUTPATIENT
Start: 2022-11-10

## 2022-11-10 RX ADMIN — DIATRIZOATE MEGLUMINE AND DIATRIZOATE SODIUM 120 ML: 660; 100 LIQUID ORAL; RECTAL at 08:44

## 2022-11-10 RX ADMIN — PANTOPRAZOLE SODIUM 40 MG: 40 INJECTION, POWDER, FOR SOLUTION INTRAVENOUS at 06:29

## 2022-11-10 RX ADMIN — AMLODIPINE BESYLATE 10 MG: 5 TABLET ORAL at 09:27

## 2022-11-10 RX ADMIN — ACETAMINOPHEN 1000 MG: 500 TABLET, FILM COATED ORAL at 06:29

## 2022-11-10 RX ADMIN — CLONIDINE HYDROCHLORIDE 0.1 MG: 0.1 TABLET ORAL at 09:27

## 2022-11-10 RX ADMIN — ACETAMINOPHEN 1000 MG: 500 TABLET, FILM COATED ORAL at 14:34

## 2022-11-10 RX ADMIN — CETIRIZINE HYDROCHLORIDE 10 MG: 10 TABLET, FILM COATED ORAL at 09:27

## 2022-11-10 RX ADMIN — VENLAFAXINE 75 MG: 75 TABLET ORAL at 09:27

## 2022-11-10 RX ADMIN — POTASSIUM CHLORIDE AND SODIUM CHLORIDE 125 ML/HR: 450; 150 INJECTION, SOLUTION INTRAVENOUS at 09:28

## 2022-11-10 RX ADMIN — ENOXAPARIN SODIUM 40 MG: 100 INJECTION SUBCUTANEOUS at 09:28

## 2022-11-10 RX ADMIN — GABAPENTIN 100 MG: 100 CAPSULE ORAL at 09:27

## 2022-11-10 RX ADMIN — CYANOCOBALAMIN 1000 MCG: 1000 INJECTION, SOLUTION INTRAMUSCULAR at 09:27

## 2022-11-10 RX ADMIN — THIAMINE HYDROCHLORIDE 100 ML/HR: 100 INJECTION, SOLUTION INTRAMUSCULAR; INTRAVENOUS at 00:15

## 2022-11-10 NOTE — CASE MANAGEMENT/SOCIAL WORK
Case Management Discharge Note                Selected Continued Care - Admitted Since 11/9/2022     Destination    No services have been selected for the patient.              Durable Medical Equipment    No services have been selected for the patient.              Dialysis/Infusion    No services have been selected for the patient.              Home Medical Care    No services have been selected for the patient.              Therapy    No services have been selected for the patient.              Community Resources    No services have been selected for the patient.              [facility]    No services have been selected for the patient.                  Transportation Services  Private: Car    Final Discharge Disposition Code: 01 - home or self-care

## 2022-11-10 NOTE — PLAN OF CARE
Goal Outcome Evaluation:  Plan of Care Reviewed With: patient        Progress: improving  Outcome Evaluation: VSS, denies c/o pain, denies c/o nausea, ambulated in hallway several times, using IS, rested well

## 2022-11-10 NOTE — PROGRESS NOTES
"Patient: Nancy Oliver  Procedure(s):  GASTRIC SLEEVE LAPAROSCOPIC  ESOPHAGOGASTRODUODENOSCOPY  Anesthesia type: General with Block    Patient location: [facility] Surgical Floor  Last vitals: /74 (BP Location: Left arm, Patient Position: Lying)   Pulse 88   Temp 98.3 °F (36.8 °C) (Oral)   Resp 20   Ht 162.6 cm (64\")   Wt 126 kg (278 lb 7.1 oz)   LMP  (LMP Unknown)   SpO2 95%   BMI 47.79 kg/m²   Level of consciousness: awake, alert and oriented    Post-anesthesia pain: adequate analgesia  Airway patency: patent  Respiratory: unassisted  Cardiovascular: stable and blood pressure at baseline  Hydration: euvolemic    Anesthetic complications: no     "

## 2022-11-10 NOTE — PROGRESS NOTES
"Bariatric Surgery Progress Note:      Chief Complaint:  POD #1    Subjective     Interval History:  Doing well.  No complaints.  Pain controlled.  Denies N/V.  No fevers.  Ambulating.  Voiding.  IS 1500.     Objective     Vital Signs  Blood pressure 125/67, pulse 89, temperature 98.2 °F (36.8 °C), temperature source Oral, resp. rate 18, height 162.6 cm (64\"), weight 126 kg (278 lb 7.1 oz), SpO2 92 %, not currently breastfeeding.      Intake/Output Summary (Last 24 hours) at 11/10/2022 1335  Last data filed at 11/10/2022 0800  Gross per 24 hour   Intake 2916 ml   Output --   Net 2916 ml       Physical Exam:  General: Alert, NAD  Abdomen: soft, appropriate, incisions okay  Extremities:(+) SCDs       Labs:  Lab Results (last 24 hours)     Procedure Component Value Units Date/Time    Iron [[id number]]  (Normal) Collected: 11/10/22 0534    Specimen: Blood Updated: 11/10/22 0639     Iron 52 mcg/dL     Comprehensive Metabolic Panel [[id number]]  (Abnormal) Collected: 11/10/22 0534    Specimen: Blood Updated: 11/10/22 0639     Glucose 118 mg/dL      BUN 11 mg/dL      Creatinine 0.81 mg/dL      Sodium 139 mmol/L      Potassium 4.4 mmol/L      Chloride 103 mmol/L      CO2 27.5 mmol/L      Calcium 9.1 mg/dL      Total Protein 7.1 g/dL      Albumin 3.70 g/dL      ALT (SGPT) 249 U/L      AST (SGOT) 149 U/L      Alkaline Phosphatase 110 U/L      Total Bilirubin 0.2 mg/dL      Globulin 3.4 gm/dL      A/G Ratio 1.1 g/dL      BUN/Creatinine Ratio 13.6     Anion Gap 8.5 mmol/L      eGFR 88.0 mL/min/1.73      Comment: National Kidney Foundation and American Society of Nephrology (ASN) Task Force recommended calculation based on the Chronic Kidney Disease Epidemiology Collaboration (CKD-EPI) equation refit without adjustment for race.       Narrative:      GFR Normal >60  Chronic Kidney Disease <60  Kidney Failure <15      CBC & Differential [[id number]]  (Abnormal) Collected: 11/10/22 0534    Specimen: Blood Updated: 11/10/22 0621    " Narrative:      The following orders were created for panel order CBC & Differential.  Procedure                               Abnormality         Status                     ---------                               -----------         ------                     CBC Auto Differential[[id number]]        Abnormal            Final result                 Please view results for these tests on the individual orders.    CBC Auto Differential [[id number]]  (Abnormal) Collected: 11/10/22 0534    Specimen: Blood Updated: 11/10/22 0621     WBC 9.09 10*3/mm3      RBC 4.58 10*6/mm3      Hemoglobin 13.0 g/dL      Hematocrit 38.9 %      MCV 84.9 fL      MCH 28.4 pg      MCHC 33.4 g/dL      RDW 13.7 %      RDW-SD 42.3 fl      MPV 10.7 fL      Platelets 223 10*3/mm3      Neutrophil % 82.9 %      Lymphocyte % 11.9 %      Monocyte % 4.4 %      Eosinophil % 0.0 %      Basophil % 0.1 %      Immature Grans % 0.7 %      Neutrophils, Absolute 7.54 10*3/mm3      Lymphocytes, Absolute 1.08 10*3/mm3      Monocytes, Absolute 0.40 10*3/mm3      Eosinophils, Absolute 0.00 10*3/mm3      Basophils, Absolute 0.01 10*3/mm3      Immature Grans, Absolute 0.06 10*3/mm3      nRBC 0.0 /100 WBC     TISSUE EXAM, [facility]) [[id number]] Collected: 11/09/22 0817    Specimen: Tissue from Stomach Updated: 11/09/22 1403            Assessment & Plan     POD # 1 s/p LSG.    Doing well.  UGI normal post-sleeve, images and report reviewed.   Elevated AST and ALT secondary to intraoperative liver retraction.  Patient feels well and has met discharge criteria.  Will discharge home with follow up in 1 week with PA.  She declined narcotics.  Rx given for Zofran.   She takes Protonix 40 mg daily at home--continue.  Hold Lasix, tramadol, Mobic, Xeljanz, leflunomide, Discharge instructions reviewed with patient and all questions answered.      This visit was conducted as a video visit, in an effort to limit spread of the novel coronavirus during the COVID-19  pandemic.  The patient gave consent.

## 2022-11-11 LAB — REF LAB TEST METHOD: NORMAL

## 2022-11-11 NOTE — OUTREACH NOTE
Prep Survey    Flowsheet Row Responses   [facility] facility patient discharged from? [facility]   Is LACE score < 7 ? No   Emergency Room discharge w/ pulse ox? No   Eligibility Readm Mgmt   Discharge diagnosis gastric sleeve   Does the patient have one of the following disease processes/diagnoses(primary or secondary)? General Surgery   Does the patient have Home health ordered? No   Is there a DME ordered? No   Medication alerts for this patient see AVS   Prep survey completed? Yes          FAUSTINO CHAIREZ - Registered Nurse

## 2022-11-14 ENCOUNTER — READMISSION MANAGEMENT (OUTPATIENT)
Dept: CALL CENTER | Facility: HOSPITAL | Age: 52
End: 2022-11-14

## 2022-11-14 NOTE — OUTREACH NOTE
General Surgery Week 1 Survey    Flowsheet Row Responses   [facility] patient discharged from? [facility]   Does the patient have one of the following disease processes/diagnoses(primary or secondary)? General Surgery   Week 1 attempt successful? Yes   Call start time 1508   Call end time 1514   Discharge diagnosis gastric sleeve   Meds reviewed with patient/caregiver? Yes   Is the patient having any side effects they believe may be caused by any medication additions or changes? No   Does the patient have all medications related to this admission filled (includes all antibiotics, pain medications, etc.) N/A   Is the patient taking all medications as directed (includes completed medication regime)? Yes   Does the patient have a follow up appointment scheduled with their surgeon? Yes  [11/16/22]   Has the patient kept scheduled appointments due by today? N/A   Psychosocial issues? No   Did the patient receive a copy of their discharge instructions? Yes   Nursing interventions Reviewed instructions with patient   What is the patient's perception of their health status since discharge? Improving   Is the patient /caregiver able to teach back basic post-op care? Drive as instructed by MD in discharge instructions   Is the patient/caregiver able to teach back signs and symptoms of incisional infection? Fever   Is the patient/caregiver able to teach back steps to recovery at home? Eat a well-balance diet, Make a list of questions for surgeon's appointment   If the patient is a current smoker, are they able to teach back resources for cessation? Not a smoker   Is the patient/caregiver able to teach back the hierarchy of who to call/visit for symptoms/problems? PCP, Specialist, Home health nurse, Urgent Care, ED, 911 Yes   Week 1 call completed? Yes   Revoked No further contact(revokes)-requires comment   Graduated/Revoked comments Pt reports she's doing well          MARIO LYNN - Registered Nurse

## 2022-11-16 ENCOUNTER — OFFICE VISIT (OUTPATIENT)
Dept: BARIATRICS/WEIGHT MGMT | Facility: CLINIC | Age: 52
End: 2022-11-16

## 2022-11-16 VITALS
WEIGHT: 254.5 LBS | HEIGHT: 64 IN | DIASTOLIC BLOOD PRESSURE: 88 MMHG | TEMPERATURE: 97.1 F | BODY MASS INDEX: 43.45 KG/M2 | HEART RATE: 96 BPM | SYSTOLIC BLOOD PRESSURE: 120 MMHG | OXYGEN SATURATION: 97 %

## 2022-11-16 DIAGNOSIS — Z98.84 STATUS POST BARIATRIC SURGERY: ICD-10-CM

## 2022-11-16 DIAGNOSIS — E66.01 OBESITY, CLASS III, BMI 40-49.9 (MORBID OBESITY): Primary | ICD-10-CM

## 2022-11-16 PROCEDURE — 99024 POSTOP FOLLOW-UP VISIT: CPT | Performed by: PHYSICIAN ASSISTANT

## 2022-11-16 NOTE — PROGRESS NOTES
[facility] Bariatric Surgery  [address]  [address]  formerly [facility] [id number] [phone]      Patient Name:  Nancy Oliver.  :  1970      Reason for Visit:  POD #7    HPI:  Nancy Oliver is a 52 y.o. female s/p LSG by  on 22    D/c POD#1- Rx zofran, protonix daily. Hold lasix, tramadol, mobic, xeljanz, leflunomide.     Doing well. Feeling great.  Has had minimal nausea, hasn't felt the need to use antiemetics. Denies any abdominal pain, SOA, dyspnea, weakness.  No issues/concerns. Denies dysphagia, reflux, vomiting, abdominal pain, pulmonary issues and fevers. She has not had her amlodipine in 2 days, feels BP are normal without it.  using IS to almost 2000.    Tolerating diet progression - on stage 1.  Getting 90-100g prot/day.  Drinking 64+ fluid oz/day. Getting PO intake without issue.   Taking MVI, B12, B1, Calcium, Vit D, iron and Vit C.  On Pantoprazole.  Holding ASA , NSAIDs , Tramadol, Hormones, Diuretics , Steroids and Immunologics and tobacco use/ exposure.  Ambulating/ active.     Presurgery weight: 264 pounds.  Today's weight is 115 kg (254 lb 8 oz) pounds, today's  Body mass index is 43.66 kg/m²., and@ weight loss since surgery is 10 pounds.     Path- benign      Past Medical History:   Diagnosis Date   • Asthma 2021   • Bronchitis     seasonal   • Bronchitis    • Carpal tunnel syndrome    • COVID-19    • Depression     After my daughter passed away from cancer   • Diastolic CHF (HCC)    • Disease of thyroid gland    • Dyspepsia    • Fatigue    • Fatty liver    • Fibromyalgia    • Generalized headaches    • GERD     EGD Dr. Malcolm 22, no HH. UGI 22 unrmk.  Remote EGD Dr. Carmona - hiatal hernia +  hpylori, takes protonix PRN   • H. pylori infection     was treated  and retreated    • History of blood transfusion     with - no reaction   • History of gallstones    • History of nuclear stress test 10/22/2021   •  "Hypertension    • Kidney stones    • Lupus (HCC)     denied, says was ruled out   • Migraine    • Palpitations    • Psoriatic arthritis (HCC) 2021    on xeljanz and arava treated by APRN PCP, saw rheumatologist for dx   • PVC's (premature ventricular contractions)    • Rectal bleed 2018   • S/P cholecystectomy     w/ gallstones   • Seasonal allergies    • Shortness of breath    • Wears glasses    • Wheezing      Past Surgical History:   Procedure Laterality Date   • BRAVO PROCEDURE N/A 2016    Procedure: ESOPHAGOGASTRODUODENOSCOPY AND LINDSEY;  Surgeon: Jorge Carmona MD;  [facility];  Service:    • BRAVO PROCEDURE N/A 2018    Procedure: ESOPHAGOGASTRODUODENOSCOPY AND LINDSEY;  Surgeon: Jorge Carmona MD;  [facility];  Service:    • CARPAL TUNNEL RELEASE Right 2012   •  SECTION     •  SECTION     •  SECTION     • CHOLECYSTECTOMY      laparoscopic,  gallstones   • COLONOSCOPY N/A 2018    Procedure: COLONOSCOPY;  Surgeon: Jorge Carmona MD;  [facility]:  [facility];  Service:    • ENDOSCOPY     • ENDOSCOPY N/A 2022    Procedure: ESOPHAGOGASTRODUODENOSCOPY;  Surgeon: Nolan Malcolm MD;  [facility]:  [facility];  Service: Bariatric;  Laterality: N/A;   • EYE SURGERY Bilateral     cornea surgery - \"to remove scaring\"   • GASTRIC SLEEVE LAPAROSCOPIC N/A 2022    Procedure: GASTRIC SLEEVE LAPAROSCOPIC;  Surgeon: Nolan Malcolm MD;  [facility]:  [facility];  Service: Bariatric;  Laterality: N/A;   • KIDNEY STONE SURGERY      lithotripsy   • LAPAROSCOPIC TUBAL LIGATION     • WISDOM TOOTH EXTRACTION       Outpatient Medications Marked as Taking for the 22 encounter (Office Visit) with Shobha Chan PA-C   Medication Sig Dispense Refill   • albuterol sulfate  (90 Base) MCG/ACT inhaler Inhale 2 puffs Every 4 (Four) Hours As Needed for Wheezing.     • ascorbic acid (VITAMIN C) 500 MG tablet Take 1 tablet by mouth " "Daily.     • Cholecalciferol (Vitamin D) 50 MCG (2000 UT) tablet Take 2,000 Units by mouth Daily.     • cloNIDine (CATAPRES) 0.1 MG tablet Take 1 tablet by mouth 2 (Two) Times a Day.     • multivitamin with minerals tablet tablet Take 1 tablet by mouth Daily.     • pantoprazole (PROTONIX) 40 MG EC tablet Take 40 mg by mouth Daily.     • venlafaxine (EFFEXOR) 75 MG tablet Take 75 mg by mouth 2 (Two) Times a Day.       Allergies   Allergen Reactions   • Penicillins Swelling     At the injection site     • Contrave [Naltrexone-Bupropion Hcl Er] Other (See Comments)     \"Felt horrible\" -\"I felt drugged\"   • Keflex [Cephalexin] Rash   • Sulfa Antibiotics Rash   • Topamax [Topiramate] Other (See Comments)     Poor memory       Social History     Socioeconomic History   • Marital status:    Tobacco Use   • Smoking status: Never   • Smokeless tobacco: Never   Vaping Use   • Vaping Use: Never used   Substance and Sexual Activity   • Alcohol use: No   • Drug use: No   • Sexual activity: Yes     Partners: Male     Birth control/protection: None       /88   Pulse 96   Temp 97.1 °F (36.2 °C)   Ht 162.6 cm (64.02\")   Wt 115 kg (254 lb 8 oz)   LMP  (LMP Unknown)   SpO2 97%   BMI 43.66 kg/m²   Physical Exam  Constitutional:       Appearance: She is well-developed. She is obese.   HENT:      Head: Normocephalic and atraumatic.   Cardiovascular:      Rate and Rhythm: Normal rate and regular rhythm.      Comments: HR normal on recheck  Pulmonary:      Effort: Pulmonary effort is normal. No respiratory distress.      Breath sounds: Normal breath sounds. No stridor. No wheezing or rhonchi.   Abdominal:      General: Bowel sounds are normal.      Palpations: Abdomen is soft.      Comments: Incisions healing well   Musculoskeletal:         General: No swelling.      Comments: No redness   Skin:     General: Skin is warm and dry.   Neurological:      Mental Status: She is alert.   Psychiatric:         Mood and Affect: " Mood normal.         Behavior: Behavior normal.         Thought Content: Thought content normal.         Judgment: Judgment normal.           Assessment:   POD #7 s/p LSG by  on 11/9/22    ICD-10-CM ICD-9-CM   1. Obesity, Class III, BMI 40-49.9 (morbid obesity) ([facility])  E66.01 278.01   2. Status post bariatric surgery  Z98.84 V45.86           Plan:  Doing well.  Patient was tachycardic on triage after walking in clinic.  Heart rate was normal on recheck during physical exam.  Patient is asymptomatic and feeling well.  No clinical suspicion of volume depletion, sepsis or postsurgical concern at this time.  Patient is to contact us with any new onset of symptoms.  Continue to advance diet per manual.  Increase protein intake to 100g/day.  Increase exercise/activity as tolerated.  Reviewed lifting restrictions, nothing >25 lbs x 2 more weeks.  Continue vitamins.  Continue PPI.  Continue to avoid ASA/NSAIDs/tramadol/tobacco x 6 weeks postop, steroids x 8 weeks postop.  Call w/ problems/concerns.    Class 3 Severe Obesity (BMI >=40). Obesity-related health conditions include the following: as above. Obesity is improving with treatment. BMI is is above average; BMI management plan is completed. We discussed low calorie, low carb based diet program, portion control and increasing exercise.        The patient was instructed to follow up in 3 weeks, sooner if needed.

## 2022-12-09 ENCOUNTER — OFFICE VISIT (OUTPATIENT)
Dept: BARIATRICS/WEIGHT MGMT | Facility: CLINIC | Age: 52
End: 2022-12-09

## 2022-12-09 VITALS
HEIGHT: 64 IN | RESPIRATION RATE: 18 BRPM | BODY MASS INDEX: 42.25 KG/M2 | WEIGHT: 247.5 LBS | TEMPERATURE: 97.1 F | SYSTOLIC BLOOD PRESSURE: 110 MMHG | HEART RATE: 123 BPM | OXYGEN SATURATION: 98 % | DIASTOLIC BLOOD PRESSURE: 68 MMHG

## 2022-12-09 DIAGNOSIS — E55.9 HYPOVITAMINOSIS D: ICD-10-CM

## 2022-12-09 DIAGNOSIS — Z13.0 SCREENING FOR IRON DEFICIENCY ANEMIA: ICD-10-CM

## 2022-12-09 DIAGNOSIS — E66.01 OBESITY, CLASS III, BMI 40-49.9 (MORBID OBESITY): ICD-10-CM

## 2022-12-09 DIAGNOSIS — R53.83 FATIGUE, UNSPECIFIED TYPE: Primary | ICD-10-CM

## 2022-12-09 DIAGNOSIS — Z13.21 MALNUTRITION SCREEN: ICD-10-CM

## 2022-12-09 PROCEDURE — 99024 POSTOP FOLLOW-UP VISIT: CPT | Performed by: PHYSICIAN ASSISTANT

## 2022-12-09 RX ORDER — POLYETHYLENE GLYCOL 3350 17 G/17G
17 POWDER, FOR SOLUTION ORAL AS NEEDED
COMMUNITY
Start: 2022-11-18

## 2022-12-09 RX ORDER — UBIDECARENONE 75 MG
50 CAPSULE ORAL DAILY
COMMUNITY

## 2022-12-09 NOTE — PROGRESS NOTES
[facility] Bariatric Surgery  [address]  [address]  [facility] [id number] [phone]      Patient Name:  Nancy Oliver  :  1970      Date of Visit: 2022      Reason for Visit:  1 month postop    HPI:  Nancy Oliver is a 52 y.o. female s/p LSG by  on 22    Doing well. Is extremely happy. Feels so much better already. Had one episode of nausea yesterday-otherwise has not had any.  She does report some dizziness with standing or bending over.  No other issues/concerns. Denies dysphagia, reflux, vomiting, abdominal pain, hair loss, memory loss, vision changes and numbness/tingling.  Tolerating diet progression - on stage 5.  Getting 95-100g prot/day.  Drinking <64 fluid oz/day.  Taking MVI, B12, B1 and Vit D.  On Pantoprazole.  Still holding ASA , NSAIDs , Tramadol, Hormones, Diuretics , Steroids and Immunologics.  Physical activity: walking.    Walking: tea w/ collagen   Breakfast: one egg w/ cheese   Lunch: protein shake   Dinner: chili w/ cheese       Presurgery weight:  264 pounds. Today's weight is 112 kg (247 lb 8 oz) pounds, today's Body mass index is 42.48 kg/m²., and weight loss since surgery is 17 pounds.       Past Medical History:   Diagnosis Date   • Asthma 2021   • Bronchitis     seasonal   • Bronchitis    • Carpal tunnel syndrome    • COVID-19    • Depression     After my daughter passed away from cancer   • Diastolic CHF (HCC)    • Disease of thyroid gland    • Dyspepsia    • Fatigue    • Fatty liver    • Fibromyalgia    • Generalized headaches    • GERD     EGD Dr. Malcolm 22, no HH. UGI 22 unrmk.  Remote EGD Dr. Carmona - hiatal hernia +  hpylori, takes protonix PRN   • H. pylori infection     was treated  and retreated    • History of blood transfusion     with - no reaction   • History of gallstones    • History of nuclear stress test 10/22/2021   • Hypertension    • Kidney stones    • Lupus (HCC)      "denied, says was ruled out   • Migraine    • Palpitations    • Psoriatic arthritis (HCC) 2021    on xeljanz and arava treated by APRN PCP, saw rheumatologist for dx   • PVC's (premature ventricular contractions)    • Rectal bleed 2018   • S/P cholecystectomy     w/ gallstones   • Seasonal allergies    • Shortness of breath    • Wears glasses    • Wheezing      Past Surgical History:   Procedure Laterality Date   • BRAVO PROCEDURE N/A 2016    Procedure: ESOPHAGOGASTRODUODENOSCOPY AND LINDSEY;  Surgeon: Jorge Carmona MD;  [facility];  Service:    • BRAVO PROCEDURE N/A 2018    Procedure: ESOPHAGOGASTRODUODENOSCOPY AND LINDSEY;  Surgeon: Jorge Carmona MD;  [facility];  Service:    • CARPAL TUNNEL RELEASE Right    •  SECTION     •  SECTION     •  SECTION     • CHOLECYSTECTOMY      laparoscopic,  gallstones   • COLONOSCOPY N/A 2018    Procedure: COLONOSCOPY;  Surgeon: Jorge Carmona MD;  [facility];  Service:    • ENDOSCOPY     • ENDOSCOPY N/A 2022    Procedure: ESOPHAGOGASTRODUODENOSCOPY;  Surgeon: Nolan Malcolm MD;  [facility];  Service: Bariatric;  Laterality: N/A;   • EYE SURGERY Bilateral     cornea surgery - \"to remove scaring\"   • GASTRIC SLEEVE LAPAROSCOPIC N/A 2022    Procedure: GASTRIC SLEEVE LAPAROSCOPIC;  Surgeon: Nolan Malcolm MD;  [facility];  Service: Bariatric;  Laterality: N/A;   • KIDNEY STONE SURGERY      lithotripsy   • LAPAROSCOPIC TUBAL LIGATION     • WISDOM TOOTH EXTRACTION       Outpatient Medications Marked as Taking for the 22 encounter (Office Visit) with Gretchen Farfan PA   Medication Sig Dispense Refill   • albuterol sulfate  (90 Base) MCG/ACT inhaler Inhale 2 puffs Every 4 (Four) Hours As Needed for Wheezing.     • Cholecalciferol (Vitamin D) 50 MCG (2000 UT) tablet Take 2,000 Units by mouth Daily.     • ClearLax 17 GM/SCOOP powder Take " "17 g by mouth As Needed.     • multivitamin with minerals tablet tablet Take 1 tablet by mouth Daily.     • ondansetron ODT (Zofran ODT) 4 MG disintegrating tablet Place 1 tablet on the tongue Every 8 (Eight) Hours As Needed for Nausea or Vomiting. 10 tablet 0   • pantoprazole (PROTONIX) 40 MG EC tablet Take 40 mg by mouth Daily.     • thiamine (VITAMIN B-1) 100 MG tablet  tablet Take 100 mg by mouth Daily.     • venlafaxine XR (EFFEXOR-XR) 75 MG 24 hr capsule Take 1 capsule by mouth Daily.     • vitamin B-12 (CYANOCOBALAMIN) 100 MCG tablet Take 50 mcg by mouth Daily.     • vitamin E 100 UNIT capsule Take 100 Units by mouth Daily.       Allergies   Allergen Reactions   • Penicillins Swelling     At the injection site     • Contrave [Naltrexone-Bupropion Hcl Er] Other (See Comments)     \"Felt horrible\" -\"I felt drugged\"   • Keflex [Cephalexin] Rash   • Sulfa Antibiotics Rash   • Topamax [Topiramate] Other (See Comments)     Poor memory       Social History     Socioeconomic History   • Marital status:    Tobacco Use   • Smoking status: Never   • Smokeless tobacco: Never   Vaping Use   • Vaping Use: Never used   Substance and Sexual Activity   • Alcohol use: No   • Drug use: No   • Sexual activity: Yes     Partners: Male     Birth control/protection: None     Social History     Social History Narrative    Lives in [facility] with .  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        /68 (BP Location: Left arm, Patient Position: Sitting)   Pulse (!) 123   Temp 97.1 °F (36.2 °C)   Resp 18   Ht 162.6 cm (64\")   Wt 112 kg (247 lb 8 oz)   LMP  (LMP Unknown)   SpO2 98%   BMI 42.48 kg/m²     Physical Exam  Constitutional:       Appearance: She is obese.   HENT:      Head: Normocephalic and atraumatic.   Cardiovascular:      Rate and Rhythm: Normal rate and regular rhythm.      Comments: Heart rate rechecked and normal at 95 bpm  Pulmonary:      Effort: Pulmonary effort is normal.      Breath sounds: " Normal breath sounds.   Abdominal:      General: Bowel sounds are normal. There is no distension.      Palpations: Abdomen is soft. There is no mass.      Tenderness: There is no abdominal tenderness.      Comments: Lap incisions healing well   Skin:     General: Skin is warm and dry.   Neurological:      General: No focal deficit present.      Mental Status: She is alert and oriented to person, place, and time.   Psychiatric:         Mood and Affect: Mood normal.         Behavior: Behavior normal.           Assessment:  1 month s/p LSG by  on 11/9/22     ICD-10-CM ICD-9-CM   1. Fatigue, unspecified type  R53.83 780.79   2. Malnutrition screen  Z13.21 V77.2   3. Screening for iron deficiency anemia  Z13.0 V78.0   4. Hypovitaminosis D  E55.9 268.9   5. Obesity, Class III, BMI 40-49.9 (morbid obesity) ([facility])  E66.01 278.01       Class 3 Severe Obesity (BMI >=40). Obesity-related health conditions include the following: as above . Obesity is improving with treatment. BMI is is above average; BMI management plan is completed. We discussed low calorie, low carb based diet program, portion control and increasing exercise.      Plan:  Doing well.  Continue to advance diet per manual.  Continue protein >70g/day.  Encouraged good food choices - high protein, low carb.  Continue routine physical activity.  Routine bariatric labs ordered.  Continue vitamins w/ adjustments pending lab results.  Continue to avoid ASA/NSAIDs/tobacco x 6 weeks postop, steroids x 8 weeks postop.   Call w/ problems/concerns.    Orthostasis-increase fluid intake and follow-up with PCP.    The patient was instructed to follow up in 2 months, sooner if needed.

## 2022-12-13 LAB
25(OH)D3+25(OH)D2 SERPL-MCNC: 57.1 NG/ML (ref 30–100)
ALBUMIN SERPL-MCNC: 4.5 G/DL (ref 3.5–5.2)
ALBUMIN/GLOB SERPL: 1.5 G/DL
ALP SERPL-CCNC: 85 U/L (ref 39–117)
ALT SERPL-CCNC: 29 U/L (ref 1–33)
AST SERPL-CCNC: 28 U/L (ref 1–32)
BASOPHILS # BLD AUTO: 0.03 10*3/MM3 (ref 0–0.2)
BASOPHILS NFR BLD AUTO: 0.5 % (ref 0–1.5)
BILIRUB SERPL-MCNC: 0.3 MG/DL (ref 0–1.2)
BUN SERPL-MCNC: 20 MG/DL (ref 6–20)
BUN/CREAT SERPL: 20.6 (ref 7–25)
CALCIUM SERPL-MCNC: 10.6 MG/DL (ref 8.6–10.5)
CHLORIDE SERPL-SCNC: 103 MMOL/L (ref 98–107)
CO2 SERPL-SCNC: 30 MMOL/L (ref 22–29)
CREAT SERPL-MCNC: 0.97 MG/DL (ref 0.57–1)
EGFRCR SERPLBLD CKD-EPI 2021: 70.9 ML/MIN/1.73
EOSINOPHIL # BLD AUTO: 0.29 10*3/MM3 (ref 0–0.4)
EOSINOPHIL NFR BLD AUTO: 5.1 % (ref 0.3–6.2)
ERYTHROCYTE [DISTWIDTH] IN BLOOD BY AUTOMATED COUNT: 14.8 % (ref 12.3–15.4)
FERRITIN SERPL-MCNC: 48.73 NG/ML (ref 13–150)
FOLATE SERPL-MCNC: 16.4 NG/ML (ref 4.78–24.2)
GLOBULIN SER CALC-MCNC: 3.1 GM/DL
GLUCOSE SERPL-MCNC: 84 MG/DL (ref 65–99)
HCT VFR BLD AUTO: 48.3 % (ref 34–46.6)
HGB BLD-MCNC: 15.3 G/DL (ref 12–15.9)
IMM GRANULOCYTES # BLD AUTO: 0.01 10*3/MM3 (ref 0–0.05)
IMM GRANULOCYTES NFR BLD AUTO: 0.2 % (ref 0–0.5)
IRON SERPL-MCNC: 56 MCG/DL (ref 37–145)
LYMPHOCYTES # BLD AUTO: 1.87 10*3/MM3 (ref 0.7–3.1)
LYMPHOCYTES NFR BLD AUTO: 33 % (ref 19.6–45.3)
MCH RBC QN AUTO: 28 PG (ref 26.6–33)
MCHC RBC AUTO-ENTMCNC: 31.7 G/DL (ref 31.5–35.7)
MCV RBC AUTO: 88.5 FL (ref 79–97)
METHYLMALONATE SERPL-SCNC: 126 NMOL/L (ref 0–378)
MONOCYTES # BLD AUTO: 0.45 10*3/MM3 (ref 0.1–0.9)
MONOCYTES NFR BLD AUTO: 7.9 % (ref 5–12)
NEUTROPHILS # BLD AUTO: 3.02 10*3/MM3 (ref 1.7–7)
NEUTROPHILS NFR BLD AUTO: 53.3 % (ref 42.7–76)
NRBC BLD AUTO-RTO: 0 /100 WBC (ref 0–0.2)
PLATELET # BLD AUTO: 235 10*3/MM3 (ref 140–450)
POTASSIUM SERPL-SCNC: 4.9 MMOL/L (ref 3.5–5.2)
PREALB SERPL-MCNC: 19 MG/DL (ref 10–36)
PROT SERPL-MCNC: 7.6 G/DL (ref 6–8.5)
RBC # BLD AUTO: 5.46 10*6/MM3 (ref 3.77–5.28)
SODIUM SERPL-SCNC: 143 MMOL/L (ref 136–145)
VIT B1 BLD-SCNC: 217.7 NMOL/L (ref 66.5–200)
WBC # BLD AUTO: 5.67 10*3/MM3 (ref 3.4–10.8)

## 2023-01-09 ENCOUNTER — TELEPHONE (OUTPATIENT)
Dept: BARIATRICS/WEIGHT MGMT | Facility: CLINIC | Age: 53
End: 2023-01-09
Payer: COMMERCIAL

## 2023-01-09 NOTE — TELEPHONE ENCOUNTER
Pt called, left VM stating that she went to the ER in [facility] over the weekend for abdominal pain. They diagnosed her with a kidney stone and sent her home. She stated that her liver enzymes were elevated on labs though and wanted to make you aware.

## 2023-01-11 NOTE — TELEPHONE ENCOUNTER
Called pt, advised to speak with PCP about this. She is going to schedule and appt. Pt understood with no further questions or concerns.

## 2023-03-02 ENCOUNTER — OFFICE VISIT (OUTPATIENT)
Dept: BARIATRICS/WEIGHT MGMT | Facility: CLINIC | Age: 53
End: 2023-03-02
Payer: COMMERCIAL

## 2023-03-02 VITALS
DIASTOLIC BLOOD PRESSURE: 68 MMHG | OXYGEN SATURATION: 97 % | WEIGHT: 231.5 LBS | TEMPERATURE: 97.5 F | BODY MASS INDEX: 39.52 KG/M2 | HEART RATE: 94 BPM | SYSTOLIC BLOOD PRESSURE: 108 MMHG | RESPIRATION RATE: 16 BRPM | HEIGHT: 64 IN

## 2023-03-02 DIAGNOSIS — R53.83 FATIGUE, UNSPECIFIED TYPE: ICD-10-CM

## 2023-03-02 DIAGNOSIS — K21.9 GASTROESOPHAGEAL REFLUX DISEASE WITHOUT ESOPHAGITIS: ICD-10-CM

## 2023-03-02 DIAGNOSIS — R79.0 ABNORMAL BLOOD LEVEL OF IRON: ICD-10-CM

## 2023-03-02 DIAGNOSIS — E55.9 VITAMIN D DEFICIENCY: ICD-10-CM

## 2023-03-02 DIAGNOSIS — I10 PRIMARY HYPERTENSION: ICD-10-CM

## 2023-03-02 DIAGNOSIS — E66.9 OBESITY, CLASS II, BMI 35-39.9: Primary | ICD-10-CM

## 2023-03-02 DIAGNOSIS — M79.7 FIBROMYALGIA: ICD-10-CM

## 2023-03-02 PROCEDURE — 99213 OFFICE O/P EST LOW 20 MIN: CPT | Performed by: PHYSICIAN ASSISTANT

## 2023-03-02 RX ORDER — LEFLUNOMIDE 20 MG/1
20 TABLET ORAL DAILY
COMMUNITY

## 2023-03-02 RX ORDER — TOFACITINIB 5 MG/1
1 TABLET, FILM COATED ORAL DAILY
COMMUNITY

## 2023-03-02 NOTE — PROGRESS NOTES
[facility] Bariatric Surgery  [address]  [address]  [facility] [id number] [phone]        Patient Name:  Nancy Oliver  :  1970      Date of Visit: 2023      Reason for Visit:   3 months postop        HPI: Nancy Oliver is a 52 y.o. female s/p LSG 22 GDW    Doing well.  Worries that her weight loss has been slow, but feeling good.  Getting 100g prot/day.  Still drinking 1-2 protein shakes/day.  Has progressed diet, but says has chest pain/fullness w/ meat.  Further discussion reveals she eats quickly b/c she just doesn't have a lot of time, especially at lunch time.  Denies dysphagia/reflux/N/V/AP o/w.  No longer taking/needing PPI.  Drinking 64 fluid oz/day, enjoys flavored thompson.  Voiding well.  Physical activity: walking.    Labs 22 - acceptable.  Liquid MVI, Vit D 2000, B12, Zinc and Mag.     Presurgery weight: 264 pounds.  Today's weight is 105 kg (231 lb 8 oz) pounds, today's  Body mass index is 39.74 kg/m²., and weight loss since surgery is 33 pounds.      Past Medical History:   Diagnosis Date   • Asthma 2021   • Bronchitis     seasonal   • Bronchitis    • Carpal tunnel syndrome    • COVID-19    • Depression     After my daughter passed away from cancer   • Diastolic CHF (HCC)    • Disease of thyroid gland    • Dyspepsia    • Fatigue    • Fatty liver    • Fibromyalgia    • Generalized headaches    • GERD     EGD Dr. Malcolm 22, no HH. UGI 22 unrmk.  Remote EGD Dr. Carmona - hiatal hernia +  hpylori, takes protonix PRN   • H. pylori infection     was treated  and retreated    • History of blood transfusion     with - no reaction   • History of gallstones    • History of nuclear stress test 10/22/2021   • Hypertension    • Kidney stones    • Lupus (HCC)     denied, says was ruled out   • Migraine    • Palpitations    • Psoriatic arthritis (HCC) 2021    on xeljanz and arava treated by APRN PCP, saw  "rheumatologist for dx   • PVC's (premature ventricular contractions)    • Rectal bleed 2018   • S/P cholecystectomy     w/ gallstones   • Seasonal allergies    • Shortness of breath    • Wears glasses    • Wheezing      Past Surgical History:   Procedure Laterality Date   • BRAVO PROCEDURE N/A 2016    Procedure: ESOPHAGOGASTRODUODENOSCOPY AND LINDSEY;  Surgeon: Jroge Carmona MD;  [facility]  [facility];  Service:    • BRAVO PROCEDURE N/A 2018    Procedure: ESOPHAGOGASTRODUODENOSCOPY AND LINDSEY;  Surgeon: Jorge Carmona MD;  [facility];  Service:    • CARPAL TUNNEL RELEASE Right 2012   •  SECTION     •  SECTION     •  SECTION     • CHOLECYSTECTOMY      laparoscopic,  gallstones   • COLONOSCOPY N/A 2018    Procedure: COLONOSCOPY;  Surgeon: Jorge Carmona MD;  [facility];  Service:    • ENDOSCOPY     • ENDOSCOPY N/A 2022    Procedure: ESOPHAGOGASTRODUODENOSCOPY;  Surgeon: Nolan Malcolm MD;  [facility];  Service: Bariatric;  Laterality: N/A;   • EYE SURGERY Bilateral     cornea surgery - \"to remove scaring\"   • GASTRIC SLEEVE LAPAROSCOPIC N/A 2022    Procedure: GASTRIC SLEEVE LAPAROSCOPIC;  Surgeon: Nolan Malcolm MD;  Location: [facility] OR;  Service: Bariatric;  Laterality: N/A;   • KIDNEY STONE SURGERY      lithotripsy   • LAPAROSCOPIC TUBAL LIGATION     • WISDOM TOOTH EXTRACTION       Outpatient Medications Marked as Taking for the 3/2/23 encounter (Office Visit) with Michaelle Oates PA   Medication Sig Dispense Refill   • albuterol sulfate  (90 Base) MCG/ACT inhaler Inhale 2 puffs Every 4 (Four) Hours As Needed for Wheezing.     • Cholecalciferol (Vitamin D) 50 MCG (2000 UT) tablet Take 2,000 Units by mouth Daily.     • ClearLax 17 GM/SCOOP powder Take 17 g by mouth As Needed.     • multivitamin with minerals tablet tablet Take 1 tablet by mouth Daily.     • ondansetron ODT (Zofran ODT) 4 MG " "disintegrating tablet Place 1 tablet on the tongue Every 8 (Eight) Hours As Needed for Nausea or Vomiting. 10 tablet 0   • vitamin B-12 (CYANOCOBALAMIN) 100 MCG tablet Take 50 mcg by mouth Daily.     • Zinc 50 MG tablet Take 1 tablet by mouth Daily.         Allergies   Allergen Reactions   • Penicillins Swelling     At the injection site     • Contrave [Naltrexone-Bupropion Hcl Er] Other (See Comments)     \"Felt horrible\" -\"I felt drugged\"   • Keflex [Cephalexin] Rash   • Sulfa Antibiotics Rash   • Topamax [Topiramate] Other (See Comments)     Poor memory       Social History     Socioeconomic History   • Marital status:    Tobacco Use   • Smoking status: Never   • Smokeless tobacco: Never   Vaping Use   • Vaping Use: Never used   Substance and Sexual Activity   • Alcohol use: No   • Drug use: No   • Sexual activity: Yes     Partners: Male     Birth control/protection: Post-menopausal, Tubal ligation     Social History     Social History Narrative    Lives in [facility] with .  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        /68 (BP Location: Left arm, Patient Position: Sitting)   Pulse 94   Temp 97.5 °F (36.4 °C)   Resp 16   Ht 162.6 cm (64\")   Wt 105 kg (231 lb 8 oz)   LMP  (LMP Unknown)   SpO2 97%   BMI 39.74 kg/m²     Physical Exam  Constitutional:       Appearance: She is well-developed.      Comments: wearing a mask   Cardiovascular:      Rate and Rhythm: Normal rate.   Pulmonary:      Effort: Pulmonary effort is normal.   Musculoskeletal:         General: Normal range of motion.   Neurological:      Mental Status: She is alert.   Psychiatric:         Thought Content: Thought content normal.         Judgment: Judgment normal.           Assessment:  3 months s/p LSG 11/9/22 GDW      ICD-10-CM ICD-9-CM   1. Obesity, Class II, BMI 35-39.9  E66.9 278.00   2. Primary hypertension  I10 401.9   3. Gastroesophageal reflux disease without esophagitis  K21.9 530.81   4. Fibromyalgia  M79.7 " 729.1   5. Fatigue, unspecified type  R53.83 780.79   6. Vitamin D deficiency  E55.9 268.9   7. Abnormal blood level of iron  R79.0 790.6       Class 2 Severe Obesity (BMI >=35 and <=39.9). Obesity-related health conditions include the following: see above. Obesity is improving with treatment. BMI is is above average; BMI management plan is completed. We discussed see plan.      Plan:  Doing fine.  Continue w/ good food choices and healthy habits.  Continue protein 100g/day.  Encouraged to focus on eating behavior modifications - small bites, chew well, go slow - setting a timer may help, try to allow 20-30 minutes for a meal.  Continue routine physical activity.  Routine bariatric labs ordered.  Continue vitamins w/ adjustments pending lab results.  Call w/ problems/concerns.     The patient was instructed to follow up in 3 months, sooner if needed.

## 2023-03-07 LAB
25(OH)D3+25(OH)D2 SERPL-MCNC: 52.8 NG/ML (ref 30–100)
ALBUMIN SERPL-MCNC: 4.1 G/DL (ref 3.8–4.9)
ALBUMIN/GLOB SERPL: 1.6 {RATIO} (ref 1.2–2.2)
ALP SERPL-CCNC: 92 IU/L (ref 44–121)
ALT SERPL-CCNC: 22 IU/L (ref 0–32)
AST SERPL-CCNC: 21 IU/L (ref 0–40)
BASOPHILS # BLD AUTO: 0 X10E3/UL (ref 0–0.2)
BASOPHILS NFR BLD AUTO: 1 %
BILIRUB SERPL-MCNC: 0.2 MG/DL (ref 0–1.2)
BUN SERPL-MCNC: 21 MG/DL (ref 6–24)
BUN/CREAT SERPL: 23 (ref 9–23)
CALCIUM SERPL-MCNC: 10 MG/DL (ref 8.7–10.2)
CHLORIDE SERPL-SCNC: 102 MMOL/L (ref 96–106)
CO2 SERPL-SCNC: 25 MMOL/L (ref 20–29)
CREAT SERPL-MCNC: 0.92 MG/DL (ref 0.57–1)
EGFRCR SERPLBLD CKD-EPI 2021: 75 ML/MIN/1.73
EOSINOPHIL # BLD AUTO: 0.1 X10E3/UL (ref 0–0.4)
EOSINOPHIL NFR BLD AUTO: 3 %
ERYTHROCYTE [DISTWIDTH] IN BLOOD BY AUTOMATED COUNT: 14.6 % (ref 11.7–15.4)
FERRITIN SERPL-MCNC: 39 NG/ML (ref 15–150)
FOLATE SERPL-MCNC: 9.7 NG/ML
GLOBULIN SER CALC-MCNC: 2.6 G/DL (ref 1.5–4.5)
GLUCOSE SERPL-MCNC: 85 MG/DL (ref 70–99)
HCT VFR BLD AUTO: 46.3 % (ref 34–46.6)
HGB BLD-MCNC: 14.9 G/DL (ref 11.1–15.9)
IMM GRANULOCYTES # BLD AUTO: 0 X10E3/UL (ref 0–0.1)
IMM GRANULOCYTES NFR BLD AUTO: 0 %
IRON SERPL-MCNC: 79 UG/DL (ref 27–159)
LYMPHOCYTES # BLD AUTO: 2.2 X10E3/UL (ref 0.7–3.1)
LYMPHOCYTES NFR BLD AUTO: 49 %
MCH RBC QN AUTO: 28.1 PG (ref 26.6–33)
MCHC RBC AUTO-ENTMCNC: 32.2 G/DL (ref 31.5–35.7)
MCV RBC AUTO: 87 FL (ref 79–97)
METHYLMALONATE SERPL-SCNC: 126 NMOL/L (ref 0–378)
MONOCYTES # BLD AUTO: 0.4 X10E3/UL (ref 0.1–0.9)
MONOCYTES NFR BLD AUTO: 8 %
NEUTROPHILS # BLD AUTO: 1.7 X10E3/UL (ref 1.4–7)
NEUTROPHILS NFR BLD AUTO: 39 %
PLATELET # BLD AUTO: 170 X10E3/UL (ref 150–450)
POTASSIUM SERPL-SCNC: 4.9 MMOL/L (ref 3.5–5.2)
PREALB SERPL-MCNC: 23 MG/DL (ref 10–36)
PROT SERPL-MCNC: 6.7 G/DL (ref 6–8.5)
RBC # BLD AUTO: 5.3 X10E6/UL (ref 3.77–5.28)
SODIUM SERPL-SCNC: 140 MMOL/L (ref 134–144)
VIT B1 BLD-SCNC: 120.1 NMOL/L (ref 66.5–200)
WBC # BLD AUTO: 4.4 X10E3/UL (ref 3.4–10.8)

## 2024-06-17 NOTE — TELEPHONE ENCOUNTER
Ms. Oliver called in with rash that was hot to touch and itching.  Told her to stop her antibiotics.  Called her pharmacy and they are going to give her possibly Flagyl and Tetracycline along with Pepto Bismol if her insurance will pay for it.  Called patient back and she is to call pharmacy tomorrow to see if they could get the tetracycline and if her insurance would pay for it.   
room air

## 2025-02-08 NOTE — TELEPHONE ENCOUNTER
----- Message from Nolan Malcolm MD -----    Can you please order a barium upper GI?  Thanks!       36.3 no

## (undated) DEVICE — BARIATRIC KIT: Brand: MEDLINE INDUSTRIES, INC.

## (undated) DEVICE — GOWN,REINF,POLY,ECL,PP SLV,XL: Brand: MEDLINE

## (undated) DEVICE — THE BITE BLOCK MAXI, LATEX FREE STRAP IS USED TO PROTECT THE ENDOSCOPE INSERTION TUBE FROM BEING BITTEN BY THE PATIENT.

## (undated) DEVICE — NDL HYPO ECLPS SFTY 22G 1 1/2IN

## (undated) DEVICE — TROC STANDARDTROCAR FOR/TITANSGS 19MM DISP STRL

## (undated) DEVICE — NEEDLE, QUINCKE, 22GX3.5": Brand: MEDLINE

## (undated) DEVICE — SMARTGOWN SURGICAL GOWN, 2XL: Brand: CONVERTORS

## (undated) DEVICE — TUBING, SUCTION, 1/4" X 20', STRAIGHT: Brand: MEDLINE INDUSTRIES, INC.

## (undated) DEVICE — Device

## (undated) DEVICE — ENDOPATH XCEL BLADELESS TROCARS WITH STABILITY SLEEVES: Brand: ENDOPATH XCEL

## (undated) DEVICE — Device: Brand: STANDARD BOUGIE, 38FR

## (undated) DEVICE — CONN Y IRR DISP 1P/U

## (undated) DEVICE — Device: Brand: ENDOGATOR

## (undated) DEVICE — SOL NACL 0.9PCT 1000ML

## (undated) DEVICE — APL DUPLOSPRAYER MIS 40CM

## (undated) DEVICE — MARKR SKIN W/RULR

## (undated) DEVICE — CAPS TRANSMTR ENDOSC PH MONTR BRAVO

## (undated) DEVICE — Device: Brand: DEFENDO AIR/WATER/SUCTION AND BIOPSY VALVE

## (undated) DEVICE — LAPAROSCOPIC DISSECTOR: Brand: DEROYAL

## (undated) DEVICE — GLV SURG SENSICARE W/ALOE PF LF 9 STRL

## (undated) DEVICE — SYR LL BD 10CC

## (undated) DEVICE — SHT AIR TRANSFR COMFRT GLIDE LAT 40X80IN

## (undated) DEVICE — FLTR PLUMEPORT LAP W/CONN STRL

## (undated) DEVICE — SYR LUERLOK 30CC

## (undated) DEVICE — GLV SURG SENSICARE W/ALOE PF LF 8.5 STRL

## (undated) DEVICE — SINGLE PORT MANIFOLD: Brand: NEPTUNE 2

## (undated) DEVICE — SLV SCD CALF HEMOFORCE DVT THERP REPROC MD

## (undated) DEVICE — GOWN,PREVENTION PLUS,LARGE,STERILE: Brand: MEDLINE

## (undated) DEVICE — ENSEAL LAPAROSCOPIC TISSUE SEALER G2 ARTICULATING CURVED JAW FOR USE WITH G2 GENERATOR 5MM DIAMETER 45CM SHAFT LENGTH: Brand: ENSEAL

## (undated) DEVICE — SUCTION CANISTER, 1500CC, RIGID: Brand: DEROYAL